# Patient Record
Sex: MALE | Race: WHITE | NOT HISPANIC OR LATINO | Employment: OTHER | URBAN - METROPOLITAN AREA
[De-identification: names, ages, dates, MRNs, and addresses within clinical notes are randomized per-mention and may not be internally consistent; named-entity substitution may affect disease eponyms.]

---

## 2019-03-25 ENCOUNTER — TRANSFERRED RECORDS (OUTPATIENT)
Dept: PHYSICAL THERAPY | Facility: CLINIC | Age: 69
End: 2019-03-25

## 2021-12-20 ENCOUNTER — THERAPY VISIT (OUTPATIENT)
Dept: PHYSICAL THERAPY | Facility: CLINIC | Age: 71
End: 2021-12-20
Payer: COMMERCIAL

## 2021-12-20 DIAGNOSIS — M75.40 IMPINGEMENT SYNDROME, SHOULDER, UNSPECIFIED LATERALITY: ICD-10-CM

## 2021-12-20 DIAGNOSIS — M54.2 NECK PAIN: Primary | ICD-10-CM

## 2021-12-20 DIAGNOSIS — G25.89 SCAPULAR DYSKINESIS: ICD-10-CM

## 2021-12-20 PROCEDURE — 97110 THERAPEUTIC EXERCISES: CPT | Mod: GP | Performed by: PHYSICAL THERAPIST

## 2021-12-20 PROCEDURE — 97161 PT EVAL LOW COMPLEX 20 MIN: CPT | Mod: GP | Performed by: PHYSICAL THERAPIST

## 2021-12-20 NOTE — PROGRESS NOTES
"Physical Therapy Initial Evaluation  Subjective:  The history is provided by the patient. No  was used.   Therapist Generated HPI Evaluation  Problem details: \"Guicho\" is a pleasant 72 yo male referred to physical therapy by SADE Fam , with chief complaint and diagnosis of    1. Cervicalgia  2. R RTC Tear  3. Bilateral Shoudler Impingement syndrome  4. R ACJ Arthrosis     Patient notes that he originally hurt his neck and shoulder while power washing his home last spring with arms overhead and looking upward.     He has been seeing a chiropractor that offers acupuncture, massage and Activator manipulation. He notes of temporary relief.     Guicho has a daily exercise program that he performs that is mostly related to his R hip and low back in a supine position that we had reviewed.     He has discontinued therapy exercises prescribed a few months ago while attending PT that included free weight and therabands.     Guicho had attended PT for roughly 3 months without full resolution, however, he notes that his mobility is improved.     Functionally Guicho's neck and shoulder pain is made worse when laying on his sides, reaching to a high shelf or lifting greater than 10#.     Best at complete rest and minor temporary relief with chiro care.     Overall health is self reported as  fair to good.     He has PMH : of chronic back and neck pain, heart replacement, Thyroid dysfunction, Kidney disease, depression and cancer.     He is retired .         Type of problem:  Right shoulder.    This is a chronic condition.  Condition occurred with:  Lifting and repetition/overuse.  Where condition occurred: at home.  Patient reports pain:  Anterior, lateral, scapular area and upper arm.  Pain is described as aching (7/10 , occasional referred sx's to R hand ) and is intermittent.  Pain radiates to:  Upper arm and cervical. Pain is the same all the time.  Since onset symptoms are unchanged.  Associated " symptoms:  Loss of motion/stiffness, loss of strength and painful arc. Symptoms are exacerbated by carrying, lying on extremity, lifting, using arm at shoulder level, using arm behind back and using arm overhead  and relieved by rest.  Special tests included:  X-ray.  Previous treatment includes physical therapy. There was mild improvement following previous treatment.  Work activity restrictions: retired   Barriers include:  None as reported by patient.                        Objective:  Standing Alignment:    Cervical/Thoracic:  Forward head  Shoulder/UE:  Rounded shoulders and scapular winging R              Gait:    Gait Type:  Antalgic   Weight Bearing Status:  WBAT   Assistive Devices:  None      Flexibility/Screens:   Positive screens:  Cervical          Neurological: He is alert. He has normal motor skills, normal sensation and intact cranial nerves.                      Shoulder Evaluation:  ROM:  AROM:    Flexion:  Left:  140    Right:  150                      Extension/Internal Rotation:  Left:  T12    Right:  T12    PROM:    Flexion:  Left:  150    Right: 160          Internal Rotation:  Left:  90    Right:  90  External Rotation:  Left:  50    Right:  50                    Strength:  : weaka nd painful bilaterall with ER and abduction . Scap dyskiinesia noted R > L . Scapolothoracic  substitution                         Special Tests:    Left shoulder positive for the following special tests:  Impingement  Right shoulder positive for the following special tests:Impingement and Acrimioclavicular  Palpation:    Left shoulder tenderness present at:  Supraspinatus and Upper Trap  Right shoulder tenderness present at: Acrimioclavicular; Supraspinatus and Upper Trap                                     General     ROS    Assessment/Plan:    Patient is a 71 year old male with cervical and both sides shoulder complaints.    Patient has the following significant findings with corresponding treatment plan.                 Diagnosis 1:  Bilateral shoulder pain, scap dyskinesia     Diagnosis 2:  Cervicalgia        Therapy Evaluation Codes:   1) History comprised of:   Personal factors that impact the plan of care:      None.    Comorbidity factors that impact the plan of care are:      Cancer, Depression, Fibromyalgia, Heart problems, High blood pressure, Implanted device, Osteoarthritis and heart replacement, Thyroid dysfunction. .     Medications impacting care: Cardiac, High blood pressure and Tylenol .  2) Examination of Body Systems comprised of:   Body structures and functions that impact the plan of care:      Cervical spine and Shoulder.   Activity limitations that impact the plan of care are:      Bathing, Cooking, Driving, Dressing, Lifting and Sleeping.  3) Clinical presentation characteristics are:   Stable/Uncomplicated.  4) Decision-Making    Moderate complexity using standardized patient assessment instrument and/or measureable assessment of functional outcome.  Cumulative Therapy Evaluation is: Moderate complexity.    Previous and current functional limitations:  (See Goal Flow Sheet for this information)    Short term and Long term goals: (See Goal Flow Sheet for this information)     Communication ability:  Patient appears to be able to clearly communicate and understand verbal and written communication and follow directions correctly.  Treatment Explanation - The following has been discussed with the patient:   RX ordered/plan of care  Anticipated outcomes  Possible risks and side effects  This patient would benefit from PT intervention to resume normal activities.   Rehab potential is good.    Frequency:  1 X week, once daily  Duration:  for 12 weeks  Discharge Plan:  Independent in home treatment program.  Reach maximal therapeutic benefit.    Please refer to the daily flowsheet for treatment today, total treatment time and time spent performing 1:1 timed codes.

## 2021-12-20 NOTE — PROGRESS NOTES
[facility]    OUTPATIENT Physical Therapy ORTHOPEDIC EVALUATION  PLAN OF TREATMENT FOR OUTPATIENT REHABILITATION  (COMPLETE FOR INITIAL CLAIMS ONLY)  Patient's Last Name, First Name, M.I.  YOB: 1950  Guicho Pratt    Provider s Name:  [facility]   Medical Record No.  [id number]   Start of Care Date:  12/20/21   Onset Date:   11/29/21   Type:     _X__PT   ___OT Medical Diagnosis:  No diagnosis found.     Treatment Diagnosis:  R shoudler RTC Tear, cervicalgia         Goals:     12/20/21 0500   Body Part   Goals listed below are for R neck and shoulder    Goal #1   Goal #1 reaching   Previous Functional Level No restrictions   Current Functional Level Cannot reach ;behind back;to shoulder level;overhead;out to the side   STG Target Performance Reach ;to shoulder level;out to the side   Rationale for independent personal hygiene;for dressing;for bathing   Due date 01/24/22   LTG Target Performance Reach;behind back;to shoulder level;overhead;out to the side;Unrestricted reaching   Rationale for independent personal hygiene;for dressing;for bathing;for safe driving, hook seat belt, reach shift lever and turn signal, using both hands on steering wheel   Due date 02/25/22       Therapy Frequency:  1x/week   Predicted Duration of Therapy Intervention:  12 weeks    Hilario Najera, PT                 I CERTIFY THE NEED FOR THESE SERVICES FURNISHED UNDER        THIS PLAN OF TREATMENT AND WHILE UNDER MY CARE .             Physician Signature               Date    X_____________________________________________________                             Certification Date From:  12/20/21   Certification Date To:  03/19/22    Referring Provider:  Oswald Junior    Initial Assessment        See Epic Evaluation SOC Date: 12/20/21

## 2022-01-03 ENCOUNTER — THERAPY VISIT (OUTPATIENT)
Dept: PHYSICAL THERAPY | Facility: CLINIC | Age: 72
End: 2022-01-03
Payer: COMMERCIAL

## 2022-01-03 DIAGNOSIS — M75.40 IMPINGEMENT SYNDROME, SHOULDER, UNSPECIFIED LATERALITY: ICD-10-CM

## 2022-01-03 DIAGNOSIS — M54.2 NECK PAIN: Primary | ICD-10-CM

## 2022-01-03 DIAGNOSIS — G25.89 SCAPULAR DYSKINESIS: ICD-10-CM

## 2022-01-03 PROCEDURE — 97140 MANUAL THERAPY 1/> REGIONS: CPT | Mod: GP | Performed by: PHYSICAL THERAPIST

## 2022-01-03 PROCEDURE — 97110 THERAPEUTIC EXERCISES: CPT | Mod: GP | Performed by: PHYSICAL THERAPIST

## 2022-01-11 ENCOUNTER — THERAPY VISIT (OUTPATIENT)
Dept: PHYSICAL THERAPY | Facility: CLINIC | Age: 72
End: 2022-01-11
Payer: COMMERCIAL

## 2022-01-11 DIAGNOSIS — G25.89 SCAPULAR DYSKINESIS: ICD-10-CM

## 2022-01-11 DIAGNOSIS — M75.40 IMPINGEMENT SYNDROME, SHOULDER, UNSPECIFIED LATERALITY: ICD-10-CM

## 2022-01-11 DIAGNOSIS — M54.2 NECK PAIN: Primary | ICD-10-CM

## 2022-01-11 PROCEDURE — 97530 THERAPEUTIC ACTIVITIES: CPT | Mod: GP | Performed by: PHYSICAL THERAPIST

## 2022-01-11 PROCEDURE — 97140 MANUAL THERAPY 1/> REGIONS: CPT | Mod: GP | Performed by: PHYSICAL THERAPIST

## 2022-01-18 ENCOUNTER — THERAPY VISIT (OUTPATIENT)
Dept: PHYSICAL THERAPY | Facility: CLINIC | Age: 72
End: 2022-01-18
Payer: COMMERCIAL

## 2022-01-18 DIAGNOSIS — M54.2 NECK PAIN: Primary | ICD-10-CM

## 2022-01-18 PROCEDURE — 97012 MECHANICAL TRACTION THERAPY: CPT | Mod: GP | Performed by: PHYSICAL THERAPIST

## 2022-01-18 PROCEDURE — 97035 APP MDLTY 1+ULTRASOUND EA 15: CPT | Mod: GP | Performed by: PHYSICAL THERAPIST

## 2022-01-18 PROCEDURE — 97140 MANUAL THERAPY 1/> REGIONS: CPT | Mod: GP | Performed by: PHYSICAL THERAPIST

## 2022-01-20 ENCOUNTER — THERAPY VISIT (OUTPATIENT)
Dept: PHYSICAL THERAPY | Facility: CLINIC | Age: 72
End: 2022-01-20
Payer: COMMERCIAL

## 2022-01-20 DIAGNOSIS — M54.2 NECK PAIN: Primary | ICD-10-CM

## 2022-01-20 DIAGNOSIS — G25.89 SCAPULAR DYSKINESIS: ICD-10-CM

## 2022-01-20 DIAGNOSIS — M75.40 IMPINGEMENT SYNDROME, SHOULDER, UNSPECIFIED LATERALITY: ICD-10-CM

## 2022-01-20 PROCEDURE — 97035 APP MDLTY 1+ULTRASOUND EA 15: CPT | Mod: GP | Performed by: PHYSICAL THERAPIST

## 2022-01-20 PROCEDURE — 97012 MECHANICAL TRACTION THERAPY: CPT | Mod: GP | Performed by: PHYSICAL THERAPIST

## 2022-01-20 PROCEDURE — 97140 MANUAL THERAPY 1/> REGIONS: CPT | Mod: GP | Performed by: PHYSICAL THERAPIST

## 2022-01-27 ENCOUNTER — THERAPY VISIT (OUTPATIENT)
Dept: PHYSICAL THERAPY | Facility: CLINIC | Age: 72
End: 2022-01-27
Payer: COMMERCIAL

## 2022-01-27 DIAGNOSIS — M54.2 NECK PAIN: Primary | ICD-10-CM

## 2022-01-27 DIAGNOSIS — G25.89 SCAPULAR DYSKINESIS: ICD-10-CM

## 2022-01-27 PROCEDURE — 97110 THERAPEUTIC EXERCISES: CPT | Mod: GP | Performed by: PHYSICAL THERAPIST

## 2022-01-27 PROCEDURE — 97012 MECHANICAL TRACTION THERAPY: CPT | Mod: GP | Performed by: PHYSICAL THERAPIST

## 2022-01-27 PROCEDURE — 97035 APP MDLTY 1+ULTRASOUND EA 15: CPT | Mod: GP | Performed by: PHYSICAL THERAPIST

## 2022-02-17 ENCOUNTER — THERAPY VISIT (OUTPATIENT)
Dept: PHYSICAL THERAPY | Facility: CLINIC | Age: 72
End: 2022-02-17
Payer: COMMERCIAL

## 2022-02-17 DIAGNOSIS — G25.89 SCAPULAR DYSKINESIS: ICD-10-CM

## 2022-02-17 DIAGNOSIS — M54.2 NECK PAIN: Primary | ICD-10-CM

## 2022-02-17 PROCEDURE — 97035 APP MDLTY 1+ULTRASOUND EA 15: CPT | Mod: GP | Performed by: PHYSICAL THERAPIST

## 2022-02-17 PROCEDURE — 97140 MANUAL THERAPY 1/> REGIONS: CPT | Mod: GP | Performed by: PHYSICAL THERAPIST

## 2022-02-17 PROCEDURE — 97012 MECHANICAL TRACTION THERAPY: CPT | Mod: GP | Performed by: PHYSICAL THERAPIST

## 2022-02-17 NOTE — PROGRESS NOTES
Subjective:  HPI  Physical Exam                    Objective:  System    Physical Exam    General     ROS    Assessment/Plan:    PROGRESS  REPORT    Progress reporting period is from 12/20/2021 to 2/17/2022. 6 visits .     SUBJECTIVE   Guicho notes that he had a shoulder injection last week.   No significant change.   Scheduled for neck on Tuesday next week.    SPADI improved from 50/100 to 30/100      Current Pain level: 2/10   Initial Pain level: 8/10   Changes in function: No changes noted in function since last SOAP note   Adverse reactions: None;   ,         OBJECTIVE  Primary sharp pain is hit and miss. Chiro seems to help with neck pain.     He is hit and miss with shoudler exercises. Primary complaint is the neck and referres sx's.     Plan of care at this point is to really manage neck pain and once resolved, it will be clear to manage shoulder strength and function.       ASSESSMENT/PLAN  Updated problem list and treatment plan: Diagnosis 1:  Neck pain, spondylosis, HNP     Diagnosis 2:  R shoulder pain/dyskinesia      STG/LTGs have been met or progress has been made towards goals:  Yes (See Goal flow sheet completed today.)  Assessment of Progress: The patient's condition is improving.  The patient's condition has potential to improve.      Recommendations:  This patient would benefit from further evaluation.    Please refer to the daily flowsheet for treatment today, total treatment time and time spent performing 1:1 timed codes.

## 2022-03-21 ENCOUNTER — THERAPY VISIT (OUTPATIENT)
Dept: PHYSICAL THERAPY | Facility: CLINIC | Age: 72
End: 2022-03-21
Payer: COMMERCIAL

## 2022-03-21 DIAGNOSIS — G25.89 SCAPULAR DYSKINESIS: ICD-10-CM

## 2022-03-21 DIAGNOSIS — M75.40 IMPINGEMENT SYNDROME, SHOULDER, UNSPECIFIED LATERALITY: ICD-10-CM

## 2022-03-21 DIAGNOSIS — M54.2 NECK PAIN: Primary | ICD-10-CM

## 2022-03-21 PROCEDURE — 97112 NEUROMUSCULAR REEDUCATION: CPT | Mod: GP | Performed by: PHYSICAL THERAPIST

## 2022-03-21 PROCEDURE — 97035 APP MDLTY 1+ULTRASOUND EA 15: CPT | Mod: GP | Performed by: PHYSICAL THERAPIST

## 2022-04-22 NOTE — PROGRESS NOTES
Subjective:  HPI  Physical Exam                    Objective:  System    Physical Exam    General     ROS    Assessment/Plan:    PROGRESS  REPORT      SUBJECTIVE   Patient returns to PT with chief complaint of  R neck, shoulder and intermittant hand sx's.     He has not been seen in therapy for the past 5 weeks .     Follow up with chiro every 2 weeks or so.     Patient feels that chiro has assisted with pain, ROM and function.     Sleeping better at night secondary to lessening neck pain.      Current Pain level: 3/10   Initial Pain level: 8/10   Changes in function: Yes, see goal flow sheet for change in function   Adverse reactions: None;   ,         OBJECTIVE   Cx ROM R rotation and SB PDM , reproduces pain to scapula as well as better mobility to the L but still reproduces sx's to the R scapula.     Supraspinatus/ Infraspinatus mm atrophy noted on exam.     Scap dyskinesia.       ASSESSMENT/PLAN  Updated problem list and treatment plan: Diagnosis 1:  Shoulder pain     Diagnosis 2:  Neck pain      STG/LTGs have been met or progress has been made towards goals:  Yes (See Goal flow sheet completed today.)  Assessment of Progress: The patient's progress has slowed.  Self Management Plans:  Patient has been instructed in a home treatment program.      Recommendations:  This patient would benefit from further evaluation.    Please refer to the daily flowsheet for treatment today, total treatment time and time spent performing 1:1 timed codes.

## 2022-06-16 ENCOUNTER — TRANSCRIBE ORDERS (OUTPATIENT)
Dept: OTHER | Age: 72
End: 2022-06-16

## 2022-06-16 DIAGNOSIS — G89.29 CHRONIC RIGHT SHOULDER PAIN: Primary | ICD-10-CM

## 2022-06-16 DIAGNOSIS — M25.511 CHRONIC RIGHT SHOULDER PAIN: Primary | ICD-10-CM

## 2022-06-16 DIAGNOSIS — M75.121 NONTRAUMATIC COMPLETE TEAR OF RIGHT ROTATOR CUFF: ICD-10-CM

## 2022-06-30 ENCOUNTER — THERAPY VISIT (OUTPATIENT)
Dept: PHYSICAL THERAPY | Facility: CLINIC | Age: 72
End: 2022-06-30
Attending: ORTHOPAEDIC SURGERY
Payer: COMMERCIAL

## 2022-06-30 DIAGNOSIS — G89.29 CHRONIC RIGHT SHOULDER PAIN: ICD-10-CM

## 2022-06-30 DIAGNOSIS — M25.511 CHRONIC RIGHT SHOULDER PAIN: ICD-10-CM

## 2022-06-30 DIAGNOSIS — M75.121 NONTRAUMATIC COMPLETE TEAR OF RIGHT ROTATOR CUFF: ICD-10-CM

## 2022-06-30 PROCEDURE — 97112 NEUROMUSCULAR REEDUCATION: CPT | Mod: GP | Performed by: PHYSICAL THERAPIST

## 2022-06-30 PROCEDURE — 97110 THERAPEUTIC EXERCISES: CPT | Mod: GP | Performed by: PHYSICAL THERAPIST

## 2022-06-30 NOTE — PROGRESS NOTES
[facility]    OUTPATIENT {PT.OT:038311} ORTHOPEDIC EVALUATION  PLAN OF TREATMENT FOR OUTPATIENT REHABILITATION  (COMPLETE FOR INITIAL CLAIMS ONLY)  Patient's Last Name, First Name, M.I.  YOB: 1950  Guicho Pratt    Provider s Name:  [facility]   Medical Record No.  [id number]   Start of Care Date:  12/20/21   Onset Date:   11/29/21   Type:     _X__PT   ___OT Medical Diagnosis:    Encounter Diagnoses   Name Primary?    Chronic right shoulder pain     Nontraumatic complete tear of right rotator cuff         Treatment Diagnosis:  R complete Tear RTC , cevicalgia        Goals:  ***    Therapy Frequency:  1x/week  Predicted Duration of Therapy Intervention:  12    Hilario Najera, PT               {Rehab Co-Sign/Paper:155638}           Certification Date From:  06/17/22   Certification Date To:  09/17/22    Referring Provider:  Carmine Mojica    Initial Assessment        See Epic Evaluation SOC Date: 12/20/21

## 2022-06-30 NOTE — PROGRESS NOTES
Subjective:  HPI  Physical Exam                    Objective:  System    Physical Exam    General     ROS    Assessment/Plan:    PROGRESS  REPORT    Progress reporting period is from 3/21/2022 to 6/30/2022.      SUBJECTIVE   Guicho has been self managing R shoulder and neck pain on his own for the past 3 months.     He was last seen in PT, 3/21/2022.     He elected to return to Dr. Mojica for consult while seeing his chiropractor for mixed intervention, including accupuncture, laser, and actuator manipulation.     Patient notes of temporary reduction of pain sx's with Laser.     He is frustrated with care model of not being able to move beyond the pain and dysfunction.     He has also been using oral gabapentin and Tylenol to manage painful nights. He sleeps better when medicated.     Functionally he is most limited with activity such as mowing his lawn, caring for his garden and light activity that he feels he should be able to do without hurting.       Current Pain level: 6/10   Initial Pain level: 8/10   Changes in function: No changes noted in function since last SOAP note   Adverse reactions: None;   ,       OBJECTIVE  Guicho does have infra and supraspinatus MM atrophy on exam.     He is not able to raise his arm more than 60-80 degrees without pain and significant scapular hike.     He has regressed on function since March noted by SPADI at 31/100 to 75/100 now.     Guicho will work on high rep endurance strength in a mid range, panfree arc for deltoid and subscapularis strengthening as directed.     Follow up in 2-3 weeks, assess function and pain management, modify self care program at that point.      ASSESSMENT/PLAN  Updated problem list and treatment plan: Diagnosis 1:  R shoulder pain       STG/LTGs have been met or progress has been made towards goals:  None  Assessment of Progress: The patient has had set backs in their progress.  Self Management Plans:  Patient has been instructed in a home treatment  program.        Recommendations:  This patient would benefit from continued therapy.     Frequency:  1 X week, once daily  Duration:  for 12 weeks        Please refer to the daily flowsheet for treatment today, total treatment time and time spent performing 1:1 timed codes.

## 2022-06-30 NOTE — PROGRESS NOTES
[facility]    OUTPATIENT Physical Therapy ORTHOPEDIC EVALUATION  PLAN OF TREATMENT FOR OUTPATIENT REHABILITATION  (COMPLETE FOR INITIAL CLAIMS ONLY)  Patient's Last Name, First Name, M.I.  YOB: 1950  Guicho Pratt    Provider s Name:  [facility]   Medical Record No.  [id number]   Start of Care Date:  12/20/21   Onset Date:   11/29/21   Type:     _X__PT   ___OT Medical Diagnosis:    Encounter Diagnoses   Name Primary?    Chronic right shoulder pain     Nontraumatic complete tear of right rotator cuff         Treatment Diagnosis:  R complete Tear RTC , cevicalgia        Goals:     06/30/22 0500   Body Part   Goals listed below are for R neck and shoulder    Goal #1   Goal #1 reaching   Previous Functional Level No restrictions   Current Functional Level Can reach ;to shoulder level   Performance level 70-80 degrees   STG Target Performance Reach ;to shoulder level;out to the side   Performance level 100-120   Rationale for independent personal hygiene;for dressing;for bathing   Due date 07/15/22   LTG Target Performance Reach;behind back;to shoulder level;overhead;out to the side;Unrestricted reaching   Rationale for independent personal hygiene;for dressing;for bathing;for safe driving, hook seat belt, reach shift lever and turn signal, using both hands on steering wheel   Due date 09/27/22       Therapy Frequency:  1x/week  Predicted Duration of Therapy Intervention:  12    Hilario Najera, PT                 I CERTIFY THE NEED FOR THESE SERVICES FURNISHED UNDER        THIS PLAN OF TREATMENT AND WHILE UNDER MY CARE .             Physician Signature               Date    X_____________________________________________________                         Certification Date From:  06/17/22   Certification Date To:  09/17/22    Referring Provider:  Carmine Riojas  Assessment        See Epic Evaluation SOC Date: 12/20/21

## 2022-07-26 ENCOUNTER — THERAPY VISIT (OUTPATIENT)
Dept: PHYSICAL THERAPY | Facility: CLINIC | Age: 72
End: 2022-07-26
Payer: COMMERCIAL

## 2022-07-26 DIAGNOSIS — M75.121 NONTRAUMATIC COMPLETE TEAR OF RIGHT ROTATOR CUFF: ICD-10-CM

## 2022-07-26 DIAGNOSIS — G89.29 CHRONIC RIGHT SHOULDER PAIN: Primary | ICD-10-CM

## 2022-07-26 DIAGNOSIS — M25.511 CHRONIC RIGHT SHOULDER PAIN: Primary | ICD-10-CM

## 2022-07-26 PROCEDURE — 97112 NEUROMUSCULAR REEDUCATION: CPT | Mod: GP | Performed by: PHYSICAL THERAPIST

## 2022-07-26 PROCEDURE — 97110 THERAPEUTIC EXERCISES: CPT | Mod: GP | Performed by: PHYSICAL THERAPIST

## 2022-08-24 ENCOUNTER — THERAPY VISIT (OUTPATIENT)
Dept: PHYSICAL THERAPY | Facility: CLINIC | Age: 72
End: 2022-08-24
Payer: COMMERCIAL

## 2022-08-24 DIAGNOSIS — M75.121 NONTRAUMATIC COMPLETE TEAR OF RIGHT ROTATOR CUFF: ICD-10-CM

## 2022-08-24 DIAGNOSIS — G89.29 CHRONIC RIGHT SHOULDER PAIN: Primary | ICD-10-CM

## 2022-08-24 DIAGNOSIS — M25.511 CHRONIC RIGHT SHOULDER PAIN: Primary | ICD-10-CM

## 2022-08-24 PROCEDURE — 97110 THERAPEUTIC EXERCISES: CPT | Mod: GP | Performed by: PHYSICAL THERAPIST

## 2022-08-30 ENCOUNTER — TRANSCRIBE ORDERS (OUTPATIENT)
Dept: OTHER | Age: 72
End: 2022-08-30

## 2022-08-30 DIAGNOSIS — M41.9 SCOLIOSIS DEFORMITY OF SPINE: Primary | ICD-10-CM

## 2022-09-13 ENCOUNTER — THERAPY VISIT (OUTPATIENT)
Dept: PHYSICAL THERAPY | Facility: CLINIC | Age: 72
End: 2022-09-13
Payer: COMMERCIAL

## 2022-09-13 DIAGNOSIS — G89.29 CHRONIC BILATERAL LOW BACK PAIN WITH RIGHT-SIDED SCIATICA: ICD-10-CM

## 2022-09-13 DIAGNOSIS — M54.41 CHRONIC BILATERAL LOW BACK PAIN WITH RIGHT-SIDED SCIATICA: ICD-10-CM

## 2022-09-13 DIAGNOSIS — G89.29 CHRONIC LEFT-SIDED LOW BACK PAIN WITH LEFT-SIDED SCIATICA: Primary | ICD-10-CM

## 2022-09-13 DIAGNOSIS — M54.42 CHRONIC LEFT-SIDED LOW BACK PAIN WITH LEFT-SIDED SCIATICA: Primary | ICD-10-CM

## 2022-09-13 PROCEDURE — 97161 PT EVAL LOW COMPLEX 20 MIN: CPT | Mod: GP | Performed by: PHYSICAL THERAPIST

## 2022-09-13 PROCEDURE — 97110 THERAPEUTIC EXERCISES: CPT | Mod: GP | Performed by: PHYSICAL THERAPIST

## 2022-09-15 NOTE — PROGRESS NOTES
[facility]    OUTPATIENT Physical Therapy ORTHOPEDIC EVALUATION  PLAN OF TREATMENT FOR OUTPATIENT REHABILITATION  (COMPLETE FOR INITIAL CLAIMS ONLY)  Patient's Last Name, First Name, M.I.  YOB: 1950  Guicho Pratt    Provider s Name:  [facility]   Medical Record No.  [id number]   Start of Care Date:  09/13/22   Onset Date:   08/30/21   Type:     _X__PT   ___OT Medical Diagnosis:    Encounter Diagnoses   Name Primary?    Chronic left-sided low back pain with left-sided sciatica Yes    Chronic bilateral low back pain with right-sided sciatica         Treatment Diagnosis:           Goals:     09/13/22 0500   Body Part   Goals listed below are for spine scoliosis   Goal #2   Goal #2 standing   Previous Functional Level No restrictions   Current Functional Level Minutes patient can stand   Performance level 3-5 minutes   STG Target Performance Minutes patient will be able to stand   Performance level 10   Rationale for housekeeping tasks such as vacuuming, bed making, mowing, gardening;for personal hygiene;for meal preparation   Due date 10/28/22   LTG Target Performance Minutes patient will be able to stand   Performance Level 15-20   Rationale for safe community ambulation;for safe household ambulation;for meal preparation;for personal hygiene   Due date 12/11/22       Therapy Frequency:  1x/week  Predicted Duration of Therapy Intervention:  12    Hilario Najera, PT                 I CERTIFY THE NEED FOR THESE SERVICES FURNISHED UNDER        THIS PLAN OF TREATMENT AND WHILE UNDER MY CARE .             Physician Signature               Date    X_____________________________________________________                         Certification Date From:  09/13/22   Certification Date To:  12/11/22    Referring Provider:  Opal Alexander    Initial Assessment        See Epic  Evaluation SOC Date: 09/13/22

## 2022-09-15 NOTE — PROGRESS NOTES
"Physical Therapy Initial Evaluation  Subjective:  The history is provided by the patient. No  was used.   Therapist Generated HPI Evaluation  Problem details: \" Guicho\" is a pleasant 71 yo male referred to physical therapy under the direction of Dr. Alexander.     DX: LUMBAR SCOLIOSIS WITH BILATERAL LEG PAIN  Treatment Order: eval and treat    Special Tests: MRI, Mylogram, CT Scan     Intervention: chiro care: traction   Worsened: lifting, turning, and twisting     Patient goal: 100% painfree and full activity without limitation.         Type of problem:  Lumbar.    This is a chronic condition.  Condition occurred with:  Degenerative joint disease, insidious onset, repetition/overuse, lifting and twisting.  Where condition occurred: for unknown reasons.  Patient reports pain:  Lower lumbar spine.  Pain is described as sharp and is intermittent (8/10).  Pain radiates to:  Foot right, foot left, lower leg left, lower leg right, knee right, knee left and thigh right. Pain is the same all the time.  Since onset symptoms are gradually worsening.  Associated symptoms:  Loss of motion, loss of motion/stiffness, loss of strength and numbness. Symptoms are exacerbated by bending and other  and relieved by ice and NSAID's (traction ).  Special tests included:  X-ray and MRI.  Previous treatment includes chiropractic. There was mild improvement following previous treatment.  Work activity restrictions: retired   Barriers include:  None as reported by patient.      Oswestry Score: 44 %                 Objective:  Standing Alignment:    Cervical/Thoracic:  Forward head  Shoulder/UE:  Rounded shoulders  Lumbar:  Lordosis decr                Flexibility/Screens:   Positive screens:  Lumbar          Neurological: He has normal motor skills and normal sensation.            Lumbar/SI Evaluation  ROM:    AROM Lumbar:   Flexion:          Forward flexion finger tips to mid shin, end range central low back pain  Ext:      "               Significantly limited just beyond neutral , End range central low back pain    Side Bend:        Left:  End range pain , L low back pain     Right:  End range pain, R low back pain   Rotation:           Left:     Right:   Side Glide:        Left:     Right:           Lumbar Myotomes:  normal            Lumbar DTR's:  normal        Lumbar Dermtomes:  normal                Neural Tension/Mobility:  Lumbar:  Not assessed        Lumbar Palpation:  not assessed                                                         General     ROS    Assessment/Plan:    Patient is a 72 year old male with lumbar complaints.    Patient has the following significant findings with corresponding treatment plan.                Diagnosis 1:  Low back scoliosis       Therapy Evaluation Codes:   1) History comprised of:   Personal factors that impact the plan of care:      None.    Comorbidity factors that impact the plan of care are:      None.     Medications impacting care: None.  2) Examination of Body Systems comprised of:   Body structures and functions that impact the plan of care:      Lumbar spine.   Activity limitations that impact the plan of care are:      Bending, Driving, Dressing, Lifting, Sitting, Stairs, Standing and Walking.  3) Clinical presentation characteristics are:   Stable/Uncomplicated.  4) Decision-Making    Low complexity using standardized patient assessment instrument and/or measureable assessment of functional outcome.  Cumulative Therapy Evaluation is: Low complexity.    Previous and current functional limitations:  (See Goal Flow Sheet for this information)    Short term and Long term goals: (See Goal Flow Sheet for this information)     Communication ability:  Patient appears to be able to clearly communicate and understand verbal and written communication and follow directions correctly.  Treatment Explanation - The following has been discussed with the patient:   RX ordered/plan of  care  Anticipated outcomes  Possible risks and side effects  This patient would benefit from PT intervention to resume normal activities.   Rehab potential is fair.    Frequency:  1 X week, once daily  Duration:  for 12 weeks  Discharge Plan:  Achieve all LTG.  Independent in home treatment program.  Reach maximal therapeutic benefit.    Rehab Plans: lumbar stabilization, global conditioning. Careful of neck and R shoulder     Please refer to the daily flowsheet for treatment today, total treatment time and time spent performing 1:1 timed codes.

## 2022-09-20 ENCOUNTER — THERAPY VISIT (OUTPATIENT)
Dept: PHYSICAL THERAPY | Facility: CLINIC | Age: 72
End: 2022-09-20
Payer: COMMERCIAL

## 2022-09-20 DIAGNOSIS — M54.41 CHRONIC BILATERAL LOW BACK PAIN WITH RIGHT-SIDED SCIATICA: ICD-10-CM

## 2022-09-20 DIAGNOSIS — G89.29 CHRONIC RIGHT SHOULDER PAIN: ICD-10-CM

## 2022-09-20 DIAGNOSIS — G89.29 CHRONIC BILATERAL LOW BACK PAIN WITH RIGHT-SIDED SCIATICA: ICD-10-CM

## 2022-09-20 DIAGNOSIS — M54.42 CHRONIC LEFT-SIDED LOW BACK PAIN WITH LEFT-SIDED SCIATICA: Primary | ICD-10-CM

## 2022-09-20 DIAGNOSIS — G89.29 CHRONIC LEFT-SIDED LOW BACK PAIN WITH LEFT-SIDED SCIATICA: Primary | ICD-10-CM

## 2022-09-20 DIAGNOSIS — M25.511 CHRONIC RIGHT SHOULDER PAIN: ICD-10-CM

## 2022-09-20 PROCEDURE — 97110 THERAPEUTIC EXERCISES: CPT | Mod: GP | Performed by: PHYSICAL THERAPIST

## 2022-10-06 ENCOUNTER — THERAPY VISIT (OUTPATIENT)
Dept: PHYSICAL THERAPY | Facility: CLINIC | Age: 72
End: 2022-10-06
Payer: COMMERCIAL

## 2022-10-06 DIAGNOSIS — M54.42 CHRONIC LEFT-SIDED LOW BACK PAIN WITH LEFT-SIDED SCIATICA: Primary | ICD-10-CM

## 2022-10-06 DIAGNOSIS — M54.41 CHRONIC BILATERAL LOW BACK PAIN WITH RIGHT-SIDED SCIATICA: ICD-10-CM

## 2022-10-06 DIAGNOSIS — G89.29 CHRONIC BILATERAL LOW BACK PAIN WITH RIGHT-SIDED SCIATICA: ICD-10-CM

## 2022-10-06 DIAGNOSIS — G89.29 CHRONIC LEFT-SIDED LOW BACK PAIN WITH LEFT-SIDED SCIATICA: Primary | ICD-10-CM

## 2022-10-06 PROCEDURE — 97110 THERAPEUTIC EXERCISES: CPT | Mod: GP | Performed by: PHYSICAL THERAPIST

## 2022-10-18 ENCOUNTER — THERAPY VISIT (OUTPATIENT)
Dept: PHYSICAL THERAPY | Facility: CLINIC | Age: 72
End: 2022-10-18
Payer: COMMERCIAL

## 2022-10-18 DIAGNOSIS — M54.41 CHRONIC BILATERAL LOW BACK PAIN WITH RIGHT-SIDED SCIATICA: ICD-10-CM

## 2022-10-18 DIAGNOSIS — M54.42 CHRONIC LEFT-SIDED LOW BACK PAIN WITH LEFT-SIDED SCIATICA: Primary | ICD-10-CM

## 2022-10-18 DIAGNOSIS — G89.29 CHRONIC BILATERAL LOW BACK PAIN WITH RIGHT-SIDED SCIATICA: ICD-10-CM

## 2022-10-18 DIAGNOSIS — G89.29 CHRONIC LEFT-SIDED LOW BACK PAIN WITH LEFT-SIDED SCIATICA: Primary | ICD-10-CM

## 2022-10-18 PROCEDURE — 97110 THERAPEUTIC EXERCISES: CPT | Mod: GP | Performed by: PHYSICAL THERAPIST

## 2022-10-18 PROCEDURE — 97012 MECHANICAL TRACTION THERAPY: CPT | Mod: GP | Performed by: PHYSICAL THERAPIST

## 2022-10-28 ENCOUNTER — THERAPY VISIT (OUTPATIENT)
Dept: PHYSICAL THERAPY | Facility: CLINIC | Age: 72
End: 2022-10-28
Payer: COMMERCIAL

## 2022-10-28 DIAGNOSIS — G89.29 CHRONIC BILATERAL LOW BACK PAIN WITH RIGHT-SIDED SCIATICA: ICD-10-CM

## 2022-10-28 DIAGNOSIS — M54.42 CHRONIC LEFT-SIDED LOW BACK PAIN WITH LEFT-SIDED SCIATICA: Primary | ICD-10-CM

## 2022-10-28 DIAGNOSIS — G89.29 CHRONIC LEFT-SIDED LOW BACK PAIN WITH LEFT-SIDED SCIATICA: Primary | ICD-10-CM

## 2022-10-28 DIAGNOSIS — M54.41 CHRONIC BILATERAL LOW BACK PAIN WITH RIGHT-SIDED SCIATICA: ICD-10-CM

## 2022-10-28 PROCEDURE — 97110 THERAPEUTIC EXERCISES: CPT | Mod: GP | Performed by: PHYSICAL THERAPIST

## 2022-10-28 PROCEDURE — 97012 MECHANICAL TRACTION THERAPY: CPT | Mod: GP | Performed by: PHYSICAL THERAPIST

## 2022-10-31 ENCOUNTER — TRANSCRIBE ORDERS (OUTPATIENT)
Dept: OTHER | Age: 72
End: 2022-10-31

## 2022-10-31 DIAGNOSIS — M51.369 OTHER INTERVERTEBRAL DISC DEGENERATION, LUMBAR REGION: ICD-10-CM

## 2022-10-31 DIAGNOSIS — M43.16 SPONDYLOLISTHESIS OF LUMBAR REGION: ICD-10-CM

## 2022-10-31 DIAGNOSIS — M41.9 SCOLIOSIS DEFORMITY OF SPINE: Primary | ICD-10-CM

## 2022-10-31 DIAGNOSIS — M25.9 JOINT DISORDER, UNSPECIFIED: ICD-10-CM

## 2022-10-31 DIAGNOSIS — M50.20 OTHER CERVICAL DISC DISPLACEMENT, UNSPECIFIED CERVICAL REGION: ICD-10-CM

## 2022-10-31 DIAGNOSIS — M41.26 OTHER IDIOPATHIC SCOLIOSIS, LUMBAR REGION: ICD-10-CM

## 2022-10-31 DIAGNOSIS — M54.12 RADICULOPATHY, CERVICAL REGION: ICD-10-CM

## 2022-11-09 ENCOUNTER — TRANSCRIBE ORDERS (OUTPATIENT)
Dept: OTHER | Age: 72
End: 2022-11-09

## 2022-11-09 DIAGNOSIS — M79.18 MYOFASCIAL PAIN: ICD-10-CM

## 2022-11-09 DIAGNOSIS — M53.3 DISORDER OF SACRUM: ICD-10-CM

## 2022-11-09 DIAGNOSIS — M54.50 LOW BACK PAIN: ICD-10-CM

## 2022-11-09 DIAGNOSIS — M79.10 MYALGIA, UNSPECIFIED SITE: Primary | ICD-10-CM

## 2022-11-16 ENCOUNTER — THERAPY VISIT (OUTPATIENT)
Dept: PHYSICAL THERAPY | Facility: CLINIC | Age: 72
End: 2022-11-16
Payer: COMMERCIAL

## 2022-11-16 DIAGNOSIS — G89.29 CHRONIC RIGHT SHOULDER PAIN: ICD-10-CM

## 2022-11-16 DIAGNOSIS — G89.29 CHRONIC LEFT-SIDED LOW BACK PAIN WITH LEFT-SIDED SCIATICA: Primary | ICD-10-CM

## 2022-11-16 DIAGNOSIS — M54.42 CHRONIC LEFT-SIDED LOW BACK PAIN WITH LEFT-SIDED SCIATICA: Primary | ICD-10-CM

## 2022-11-16 DIAGNOSIS — M75.121 NONTRAUMATIC COMPLETE TEAR OF RIGHT ROTATOR CUFF: ICD-10-CM

## 2022-11-16 DIAGNOSIS — M54.41 CHRONIC BILATERAL LOW BACK PAIN WITH RIGHT-SIDED SCIATICA: ICD-10-CM

## 2022-11-16 DIAGNOSIS — M54.2 NECK PAIN: ICD-10-CM

## 2022-11-16 DIAGNOSIS — G89.29 CHRONIC BILATERAL LOW BACK PAIN WITH RIGHT-SIDED SCIATICA: ICD-10-CM

## 2022-11-16 DIAGNOSIS — M25.511 CHRONIC RIGHT SHOULDER PAIN: ICD-10-CM

## 2022-11-16 PROCEDURE — 97012 MECHANICAL TRACTION THERAPY: CPT | Mod: GP | Performed by: PHYSICAL THERAPIST

## 2022-11-16 PROCEDURE — 97110 THERAPEUTIC EXERCISES: CPT | Mod: GP | Performed by: PHYSICAL THERAPIST

## 2022-11-23 ENCOUNTER — THERAPY VISIT (OUTPATIENT)
Dept: PHYSICAL THERAPY | Facility: CLINIC | Age: 72
End: 2022-11-23
Payer: COMMERCIAL

## 2022-11-23 DIAGNOSIS — G89.29 CHRONIC LEFT-SIDED LOW BACK PAIN WITH LEFT-SIDED SCIATICA: Primary | ICD-10-CM

## 2022-11-23 DIAGNOSIS — M54.41 CHRONIC BILATERAL LOW BACK PAIN WITH RIGHT-SIDED SCIATICA: ICD-10-CM

## 2022-11-23 DIAGNOSIS — G89.29 CHRONIC BILATERAL LOW BACK PAIN WITH RIGHT-SIDED SCIATICA: ICD-10-CM

## 2022-11-23 DIAGNOSIS — M54.42 CHRONIC LEFT-SIDED LOW BACK PAIN WITH LEFT-SIDED SCIATICA: Primary | ICD-10-CM

## 2022-11-23 PROCEDURE — 97140 MANUAL THERAPY 1/> REGIONS: CPT | Mod: GP

## 2022-11-23 PROCEDURE — 97110 THERAPEUTIC EXERCISES: CPT | Mod: GP

## 2022-11-25 ENCOUNTER — THERAPY VISIT (OUTPATIENT)
Dept: PHYSICAL THERAPY | Facility: CLINIC | Age: 72
End: 2022-11-25
Payer: COMMERCIAL

## 2022-11-25 DIAGNOSIS — M54.41 CHRONIC BILATERAL LOW BACK PAIN WITH RIGHT-SIDED SCIATICA: ICD-10-CM

## 2022-11-25 DIAGNOSIS — M54.42 CHRONIC LEFT-SIDED LOW BACK PAIN WITH LEFT-SIDED SCIATICA: Primary | ICD-10-CM

## 2022-11-25 DIAGNOSIS — G89.29 CHRONIC BILATERAL LOW BACK PAIN WITH RIGHT-SIDED SCIATICA: ICD-10-CM

## 2022-11-25 DIAGNOSIS — G89.29 CHRONIC LEFT-SIDED LOW BACK PAIN WITH LEFT-SIDED SCIATICA: Primary | ICD-10-CM

## 2022-11-25 PROCEDURE — 97140 MANUAL THERAPY 1/> REGIONS: CPT | Mod: GP

## 2022-11-25 PROCEDURE — 97110 THERAPEUTIC EXERCISES: CPT | Mod: GP

## 2022-11-28 ENCOUNTER — THERAPY VISIT (OUTPATIENT)
Dept: PHYSICAL THERAPY | Facility: CLINIC | Age: 72
End: 2022-11-28
Payer: COMMERCIAL

## 2022-11-28 DIAGNOSIS — M54.42 CHRONIC LEFT-SIDED LOW BACK PAIN WITH LEFT-SIDED SCIATICA: Primary | ICD-10-CM

## 2022-11-28 DIAGNOSIS — M54.41 CHRONIC BILATERAL LOW BACK PAIN WITH RIGHT-SIDED SCIATICA: ICD-10-CM

## 2022-11-28 DIAGNOSIS — G89.29 CHRONIC LEFT-SIDED LOW BACK PAIN WITH LEFT-SIDED SCIATICA: Primary | ICD-10-CM

## 2022-11-28 DIAGNOSIS — G89.29 CHRONIC BILATERAL LOW BACK PAIN WITH RIGHT-SIDED SCIATICA: ICD-10-CM

## 2022-11-28 PROCEDURE — 97110 THERAPEUTIC EXERCISES: CPT | Mod: GP

## 2022-12-07 ENCOUNTER — THERAPY VISIT (OUTPATIENT)
Dept: PHYSICAL THERAPY | Facility: CLINIC | Age: 72
End: 2022-12-07
Payer: COMMERCIAL

## 2022-12-07 DIAGNOSIS — M54.42 CHRONIC LEFT-SIDED LOW BACK PAIN WITH LEFT-SIDED SCIATICA: Primary | ICD-10-CM

## 2022-12-07 DIAGNOSIS — G89.29 CHRONIC LEFT-SIDED LOW BACK PAIN WITH LEFT-SIDED SCIATICA: Primary | ICD-10-CM

## 2022-12-07 PROCEDURE — 97110 THERAPEUTIC EXERCISES: CPT | Mod: GP

## 2022-12-13 ENCOUNTER — THERAPY VISIT (OUTPATIENT)
Dept: PHYSICAL THERAPY | Facility: CLINIC | Age: 72
End: 2022-12-13
Payer: COMMERCIAL

## 2022-12-13 DIAGNOSIS — M54.42 CHRONIC LEFT-SIDED LOW BACK PAIN WITH LEFT-SIDED SCIATICA: Primary | ICD-10-CM

## 2022-12-13 DIAGNOSIS — G89.29 CHRONIC RIGHT SHOULDER PAIN: ICD-10-CM

## 2022-12-13 DIAGNOSIS — M25.511 CHRONIC RIGHT SHOULDER PAIN: ICD-10-CM

## 2022-12-13 DIAGNOSIS — G89.29 CHRONIC LEFT-SIDED LOW BACK PAIN WITH LEFT-SIDED SCIATICA: Primary | ICD-10-CM

## 2022-12-13 PROCEDURE — 97110 THERAPEUTIC EXERCISES: CPT | Mod: GP | Performed by: PHYSICAL THERAPIST

## 2022-12-15 ENCOUNTER — THERAPY VISIT (OUTPATIENT)
Dept: PHYSICAL THERAPY | Facility: CLINIC | Age: 72
End: 2022-12-15
Payer: COMMERCIAL

## 2022-12-15 DIAGNOSIS — G89.29 CHRONIC LEFT-SIDED LOW BACK PAIN WITH LEFT-SIDED SCIATICA: Primary | ICD-10-CM

## 2022-12-15 DIAGNOSIS — M54.42 CHRONIC LEFT-SIDED LOW BACK PAIN WITH LEFT-SIDED SCIATICA: Primary | ICD-10-CM

## 2022-12-15 DIAGNOSIS — G89.29 CHRONIC RIGHT SHOULDER PAIN: ICD-10-CM

## 2022-12-15 DIAGNOSIS — M25.511 CHRONIC RIGHT SHOULDER PAIN: ICD-10-CM

## 2022-12-15 PROCEDURE — 97012 MECHANICAL TRACTION THERAPY: CPT | Mod: GP | Performed by: PHYSICAL THERAPIST

## 2022-12-15 PROCEDURE — 97110 THERAPEUTIC EXERCISES: CPT | Mod: GP | Performed by: PHYSICAL THERAPIST

## 2022-12-15 NOTE — PROGRESS NOTES
Subjective:  HPI  Physical Exam                    Objective:  System    Physical Exam    General     ROS    Assessment/Plan:    PROGRESS  REPORT      SUBJECTIVE      Guicho notes that his R shoulder is beginning to irritate and fail performance again. Low back is no change despite injection, therapy, self stretching.. He is feels that globally strength and function is becoming more difficult.     Initial Pain level: 8/10   Changes in function: Yes, see goal flow sheet for change in function   Adverse reactions: None;   ,         OBJECTIVE  Neurio exam is normal. Low back ROM is limited extension, rotation and SB. Global hip and lower extremity tightness. Global deceonditioned.     R shoulder capsular ROM limited. Weakness RTC.     Plan advance lumbar strength/stability, global endurance and supine RTC work shoudle r         ASSESSMENT/PLAN      Recommendations:  This patient would benefit from further evaluation.    Please refer to the daily flowsheet for treatment today, total treatment time and time spent performing 1:1 timed codes.

## 2022-12-22 ENCOUNTER — THERAPY VISIT (OUTPATIENT)
Dept: PHYSICAL THERAPY | Facility: CLINIC | Age: 72
End: 2022-12-22
Payer: COMMERCIAL

## 2022-12-22 DIAGNOSIS — M25.511 CHRONIC RIGHT SHOULDER PAIN: Primary | ICD-10-CM

## 2022-12-22 DIAGNOSIS — G89.29 CHRONIC LEFT-SIDED LOW BACK PAIN WITH LEFT-SIDED SCIATICA: ICD-10-CM

## 2022-12-22 DIAGNOSIS — G89.29 CHRONIC RIGHT SHOULDER PAIN: Primary | ICD-10-CM

## 2022-12-22 DIAGNOSIS — M54.42 CHRONIC LEFT-SIDED LOW BACK PAIN WITH LEFT-SIDED SCIATICA: ICD-10-CM

## 2022-12-22 PROCEDURE — 97110 THERAPEUTIC EXERCISES: CPT | Mod: GP

## 2023-01-05 ENCOUNTER — THERAPY VISIT (OUTPATIENT)
Dept: PHYSICAL THERAPY | Facility: CLINIC | Age: 73
End: 2023-01-05
Payer: COMMERCIAL

## 2023-01-05 DIAGNOSIS — G89.29 CHRONIC LEFT-SIDED LOW BACK PAIN WITH LEFT-SIDED SCIATICA: Primary | ICD-10-CM

## 2023-01-05 DIAGNOSIS — G89.29 CHRONIC BILATERAL LOW BACK PAIN WITH RIGHT-SIDED SCIATICA: ICD-10-CM

## 2023-01-05 DIAGNOSIS — M54.41 CHRONIC BILATERAL LOW BACK PAIN WITH RIGHT-SIDED SCIATICA: ICD-10-CM

## 2023-01-05 DIAGNOSIS — M54.42 CHRONIC LEFT-SIDED LOW BACK PAIN WITH LEFT-SIDED SCIATICA: Primary | ICD-10-CM

## 2023-01-05 PROCEDURE — 97012 MECHANICAL TRACTION THERAPY: CPT | Mod: GP | Performed by: PHYSICAL THERAPIST

## 2023-01-05 PROCEDURE — 97110 THERAPEUTIC EXERCISES: CPT | Mod: GP | Performed by: PHYSICAL THERAPIST

## 2023-01-11 ENCOUNTER — THERAPY VISIT (OUTPATIENT)
Dept: PHYSICAL THERAPY | Facility: CLINIC | Age: 73
End: 2023-01-11
Payer: COMMERCIAL

## 2023-01-11 DIAGNOSIS — G89.29 CHRONIC LEFT-SIDED LOW BACK PAIN WITH LEFT-SIDED SCIATICA: Primary | ICD-10-CM

## 2023-01-11 DIAGNOSIS — M54.41 CHRONIC BILATERAL LOW BACK PAIN WITH RIGHT-SIDED SCIATICA: ICD-10-CM

## 2023-01-11 DIAGNOSIS — G89.29 CHRONIC BILATERAL LOW BACK PAIN WITH RIGHT-SIDED SCIATICA: ICD-10-CM

## 2023-01-11 DIAGNOSIS — M54.42 CHRONIC LEFT-SIDED LOW BACK PAIN WITH LEFT-SIDED SCIATICA: Primary | ICD-10-CM

## 2023-01-11 PROCEDURE — 97012 MECHANICAL TRACTION THERAPY: CPT | Mod: GP

## 2023-01-11 PROCEDURE — 97530 THERAPEUTIC ACTIVITIES: CPT | Mod: GP

## 2023-01-11 NOTE — PROGRESS NOTES
Subjective:  The history is provided by the patient.     Patient presents for final visit prior to hip replacement surgery on 1/20/23. Patient requested traction to alleviate with LBP going into surgery next week as he has been finding temporary relief after previous sessions. Patient feels prepared and does not have many questions going into surgery next week.    Physical Exam    Objective:  System    Physical Exam  Hip Musculoskeletal Exam    Range of Motion    Right      Right hip active flexion: Limited due to pain.       Passive flexion: 90.       Passive internal rotation: 5.       Passive external rotation: 40.     Range of motion additional comments: Thoracolumbar AROM: limited into extension moderately with excessive hip and cervical mobility to compensate for thoracic and lumbar hypomobility.    Special Tests    Right      ZULLY test (right): positive      Impingement test: positive      Trendelenburg test: negative      Internal rotation: positive    Special tests additional comments: Scour: Positive for hip pain     General     ROS    Assessment/Plan:    DISCHARGE REPORT    Progress reporting period is from 9/13/22 to 1/11/23.       SUBJECTIVE  Subjective changes noted by patient: No significant change in functional activities including walking or exercise with regards to pain.   Current pain level is 5/10  .     Previous pain level was  8/10  .   Changes in function:  Yes (See Goal flowsheet attached for changes in current functional level)  Adverse reaction to treatment or activity: None    OBJECTIVE  Changes noted in objective findings:  Yes, See above.        ASSESSMENT/PLAN  Updated problem list and treatment plan: Diagnosis 1:  Right hip OA  Pain -  mechanical traction, manual therapy, education and home program  Decreased ROM/flexibility - manual therapy, therapeutic exercise and home program  Decreased strength - therapeutic exercise, therapeutic activities and home program  Impaired gait - gait  training and home program  Decreased function - therapeutic activities and home program  STG/LTGs have been met or progress has been made towards goals:  Yes (See Goal flow sheet completed today.)  Assessment of Progress: The patient's progress has plateaued and will be undergoing hip replacement surgery on 1/20/23.   I have re-evaluated this patient and find that the nature, scope, duration and intensity of the therapy is appropriate for the medical condition of the patient.  Guicho continues to require the following intervention to meet STG and LTG's:  PT following surgery on 1/20/23. Will be newly evaluated post-op.    Recommendations:  This patient would benefit from further evaluation following right hip surgery on 1/20/23.    Please refer to the daily flowsheet for treatment today, total treatment time and time spent performing 1:1 timed codes.

## 2023-03-20 ENCOUNTER — THERAPY VISIT (OUTPATIENT)
Dept: PHYSICAL THERAPY | Facility: CLINIC | Age: 73
End: 2023-03-20
Payer: COMMERCIAL

## 2023-03-20 DIAGNOSIS — Z96.649 AFTERCARE FOLLOWING HIP JOINT REPLACEMENT SURGERY, UNSPECIFIED LATERALITY: ICD-10-CM

## 2023-03-20 DIAGNOSIS — Z47.1 AFTERCARE FOLLOWING HIP JOINT REPLACEMENT SURGERY, UNSPECIFIED LATERALITY: ICD-10-CM

## 2023-03-20 DIAGNOSIS — M53.3 SI (SACROILIAC) JOINT DYSFUNCTION: ICD-10-CM

## 2023-03-20 DIAGNOSIS — M79.604 LUMBAR PAIN WITH RADIATION DOWN RIGHT LEG: ICD-10-CM

## 2023-03-20 DIAGNOSIS — M54.50 LUMBAR PAIN WITH RADIATION DOWN RIGHT LEG: ICD-10-CM

## 2023-03-20 PROCEDURE — 97161 PT EVAL LOW COMPLEX 20 MIN: CPT | Mod: GP

## 2023-03-20 PROCEDURE — 97110 THERAPEUTIC EXERCISES: CPT | Mod: GP

## 2023-03-20 ASSESSMENT — ACTIVITIES OF DAILY LIVING (ADL)
STEPPING_UP_AND_DOWN_CURBS: NO DIFFICULTY AT ALL
HOW_WOULD_YOU_RATE_YOUR_CURRENT_LEVEL_OF_FUNCTION_DURING_YOUR_USUAL_ACTIVITIES_OF_DAILY_LIVING_FROM_0_TO_100_WITH_100_BEING_YOUR_LEVEL_OF_FUNCTION_PRIOR_TO_YOUR_HIP_PROBLEM_AND_0_BEING_THE_INABILITY_TO_PERFORM_ANY_OF_YOUR_USUAL_DAILY_ACTIVITIES?: 90
SITTING_FOR_15_MINUTES: NO DIFFICULTY AT ALL
HEAVY_WORK: MODERATE DIFFICULTY
GETTING_INTO_AND_OUT_OF_AN_AVERAGE_CAR: SLIGHT DIFFICULTY
PUTTING_ON_SOCKS_AND_SHOES: NO DIFFICULTY AT ALL
RECREATIONAL_ACTIVITIES: UNABLE TO DO
WALKING_15_MINUTES_OR_GREATER: MODERATE DIFFICULTY
WALKING_APPROXIMATELY_10_MINUTES: NO DIFFICULTY AT ALL
ROLLING_OVER_IN_BED: SLIGHT DIFFICULTY
GETTING_INTO_AND_OUT_OF_A_BATHTUB: UNABLE TO DO
GOING_UP_1_FLIGHT_OF_STAIRS: NO DIFFICULTY AT ALL
LIGHT_TO_MODERATE_WORK: SLIGHT DIFFICULTY
WALKING_DOWN_STEEP_HILLS: SLIGHT DIFFICULTY
GOING_DOWN_1_FLIGHT_OF_STAIRS: NO DIFFICULTY AT ALL
WALKING_UP_STEEP_HILLS: SLIGHT DIFFICULTY
DEEP_SQUATTING: UNABLE TO DO
TWISTING/PIVOTING_ON_INVOLVED_LEG: SLIGHT DIFFICULTY

## 2023-03-20 NOTE — PROGRESS NOTES
Physical Therapy Initial Evaluation  Subjective:  The history is provided by the patient.   Therapist Generated HPI Evaluation  Problem details: Pt reports R hip is recovering well - would like guidance on home program exercises and technique, he also reports low back continues to be bothersome and would like to address this.       Posterior approach for R hip - at 8 weeks.  Reports no precautions with range of motion or restrictions.      Still uses cane on long weeks - uses it more for rest versus stability.  Can go for up to 15-20 minute outdoor walks.      Reports past pain was R posterior hip - low back still results in some regular pain in R SIJ region and lumbar region.  Denies frequent referred pain to R thigh/leg - feels this has decreased significantly.     He has stopped his exercise program over the past week to let his hip heal - but would like guidance if he should restart his program.    Does do nearly weekly chiropractic sessions as well.     Low back - multiple SI joint injections.    Goal is to improve core strength/twisting ability, and continue hip strengthening/program..         Type of problem:  Right hip.                       Previous treatment includes physical therapy. There was moderate and mild improvement following previous treatment.      Patient Health History  Guicho Pratt being seen for R BLACK, low back.     Problem began: 1/20/2023.   Problem occurred: age and deterioration   Pain is reported as 3/10 on pain scale.  General health as reported by patient is good.  Pertinent medical history includes: asthma, cancer, depression, heart problems, high blood pressure, implanted device, incontinence, kidney disease, osteoporosis, overweight and sleep disorder/apnea.   Red flags:  Calf pain-swelling-warmth, chest pain, cold/hot extremity, pain at rest/night and significant weakness.     Surgeries include:  Orthopedic surgery and heart surgery. Other surgery history details: L TKA, Heart  transplate 2015; prostate surgery .    Current medications:  Anti-depressants, anti-inflammatory, bone density, cardiac medication, high blood pressure medication and pain medication.    Current occupation is retired.   Primary job tasks include:  Prolonged sitting.                                    Objective:  System    Physical Exam    General     ROS      HIP EVALUATION    Static Posture  Trunk and hip flexion     Dynamic Movement Screen  Gait: No significant findings, HIP/TRUNK flexion posture with gait, symmetric step length    Range of Motion  Lumbar spine screen: flexion minimal loss, extension maximal loss       Hip ROM Left Right   Flexion 110 105   ER 50 40   IR 10 15           Flexibility Left Right   Quadriceps     Hamstrings moderate moderate   Ankle     Figure 4       Hip and Knee Strength   MMT Left Right   Hip Abduction 3/5 3-/5   Hip Extension 4/5 4/5   Hip ER 4/5 4/5          Special Tests   FADIR SLR Slump   Left Negative Negative Positive   Right Negative Negative Positive     Palpation  Left: Not assessed  Right: No tenderness to palpation    Assessment/Plan:  Patient is a 72 year old male with lumbar and right side hip complaints.      Guicho presents to PT 8 weeks post R BLACK posterior approach, with deficits in mobility, strength, and function.  In addition, he has significant history of lumbar/SI joint conditions in which he would benefit from skilled PT to address global deconditioning of trunk/lower extremities, as well as emphasis on lumbar/SI jt interventions to reduce pain that patient experiences.        Patient has the following significant findings with corresponding treatment plan.                Diagnosis 1:  R total hip replacement  Diagnosis 2:  Low back pain with R radicular symptoms       Pain -  hot/cold therapy, mechanical traction, manual therapy, splint/taping/bracing/orthotics, self management, education and home program  Decreased ROM/flexibility - manual therapy,  therapeutic exercise and home program  Decreased joint mobility - manual therapy, therapeutic exercise and home program  Decreased strength - therapeutic exercise, therapeutic activities and home program  Impaired balance - neuro re-education, therapeutic activities and home program  Decreased proprioception - neuro re-education, therapeutic activities and home program  Impaired muscle performance - neuro re-education and home program  Decreased function - therapeutic activities and home program  Impaired posture - neuro re-education and home program    Therapy Evaluation Codes:   Cumulative Therapy Evaluation is: Low complexity.    Previous and current functional limitations:  (See Goal Flow Sheet for this information)    Short term and Long term goals: (See Goal Flow Sheet for this information)     Communication ability:  Patient appears to be able to clearly communicate and understand verbal and written communication and follow directions correctly.  Treatment Explanation - The following has been discussed with the patient:   RX ordered/plan of care  Anticipated outcomes  Possible risks and side effects  This patient would benefit from PT intervention to resume normal activities.   Rehab potential is good.    Frequency:  2 X month, once daily  Duration:  for 5 months  Discharge Plan:  Achieve all LTG.  Independent in home treatment program.  Reach maximal therapeutic benefit.    Please refer to the daily flowsheet for treatment today, total treatment time and time spent performing 1:1 timed codes.

## 2023-03-20 NOTE — PROGRESS NOTES
"                                                                           [facility]    OUTPATIENT Physical Therapy ORTHOPEDIC EVALUATION  PLAN OF TREATMENT FOR OUTPATIENT REHABILITATION  (COMPLETE FOR INITIAL CLAIMS ONLY)  Patient's Last Name, First Name, M.I.  YOB: 1950  Guicho Pratt    Provider s Name:  [facility]   Medical Record No.  [id number]   Start of Care Date:      Onset Date:  01/20/23    Treatment Diagnosis:  R BLACK posterior approach Medical Diagnosis:  Data Unavailable       Goals:     03/20/23 0500   Body Part   Goals listed below are for R hip and back   Goal #1   Goal #1 squatting/kneeling   Previous Functional Level Could squat   (6/10 pain, UE for pushoff)   Current Functional Level Can do a partial squat  (pushoff from chair)   STG Target Performance Do a partial squat   Performance Level arms crossed squat 5x STS in 25\" from standard chair   Rationale for job requirements in their work place;for proper body mechanics while performing housework;for proper body mechanics while performing yardwork and home maintenance;for proper body mechanics when lifting, personal hygiene, dressing   Due date 04/24/23   LTG Target Performance Do a full squat   Performance Level 5x STS arms cross standard chair in 15\"   Rationale for proper body mechanics while performing housework;for job requirements in their work place;for proper body mechanics while performing yardwork and home maintenance;for proper body mechanics when lifting, personal hygiene, dressing   Due date 08/07/23         Therapy Frequency:  2x/month  Predicted Duration of Therapy Intervention:  5 months    Carlitos Selby, PT                 I CERTIFY THE NEED FOR THESE SERVICES FURNISHED UNDER        THIS PLAN OF TREATMENT AND WHILE UNDER MY CARE .             Physician Signature               Date    X_____________________________________________________             "             Certification Date From:  03/20/23   Certification Date To:  06/18/23    Referring Provider:  Gustavo Brooke    Initial Assessment        See Epic Evaluation

## 2023-04-24 ENCOUNTER — THERAPY VISIT (OUTPATIENT)
Dept: PHYSICAL THERAPY | Facility: CLINIC | Age: 73
End: 2023-04-24
Payer: COMMERCIAL

## 2023-04-24 DIAGNOSIS — M79.604 LUMBAR PAIN WITH RADIATION DOWN RIGHT LEG: ICD-10-CM

## 2023-04-24 DIAGNOSIS — Z96.649 AFTERCARE FOLLOWING HIP JOINT REPLACEMENT SURGERY, UNSPECIFIED LATERALITY: Primary | ICD-10-CM

## 2023-04-24 DIAGNOSIS — M53.3 SI (SACROILIAC) JOINT DYSFUNCTION: ICD-10-CM

## 2023-04-24 DIAGNOSIS — M54.50 LUMBAR PAIN WITH RADIATION DOWN RIGHT LEG: ICD-10-CM

## 2023-04-24 DIAGNOSIS — Z47.1 AFTERCARE FOLLOWING HIP JOINT REPLACEMENT SURGERY, UNSPECIFIED LATERALITY: Primary | ICD-10-CM

## 2023-04-24 PROCEDURE — 97014 ELECTRIC STIMULATION THERAPY: CPT | Mod: GP

## 2023-04-24 PROCEDURE — 97110 THERAPEUTIC EXERCISES: CPT | Mod: GP

## 2023-04-24 PROCEDURE — 97530 THERAPEUTIC ACTIVITIES: CPT | Mod: GP

## 2023-05-08 ENCOUNTER — THERAPY VISIT (OUTPATIENT)
Dept: PHYSICAL THERAPY | Facility: CLINIC | Age: 73
End: 2023-05-08
Payer: COMMERCIAL

## 2023-05-08 DIAGNOSIS — Z47.1 AFTERCARE FOLLOWING HIP JOINT REPLACEMENT SURGERY, UNSPECIFIED LATERALITY: Primary | ICD-10-CM

## 2023-05-08 DIAGNOSIS — M53.3 SI (SACROILIAC) JOINT DYSFUNCTION: ICD-10-CM

## 2023-05-08 DIAGNOSIS — M54.50 LUMBAR PAIN WITH RADIATION DOWN RIGHT LEG: ICD-10-CM

## 2023-05-08 DIAGNOSIS — Z96.649 AFTERCARE FOLLOWING HIP JOINT REPLACEMENT SURGERY, UNSPECIFIED LATERALITY: Primary | ICD-10-CM

## 2023-05-08 DIAGNOSIS — M79.604 LUMBAR PAIN WITH RADIATION DOWN RIGHT LEG: ICD-10-CM

## 2023-05-08 PROCEDURE — 97014 ELECTRIC STIMULATION THERAPY: CPT | Mod: GP

## 2023-05-08 PROCEDURE — 97110 THERAPEUTIC EXERCISES: CPT | Mod: GP

## 2023-05-08 PROCEDURE — 97140 MANUAL THERAPY 1/> REGIONS: CPT | Mod: GP

## 2023-05-08 NOTE — PROGRESS NOTES
Subjective:  HPI  Physical Exam                    Objective:  System    Physical Exam    General     ROS    Assessment/Plan:    PROGRESS  REPORT    Progress reporting period is from 3/20/23 to 5/8/23.       Guicho demonstrates gradual progress with initiation of PT.  He has been seen for three sessions, in which his strength and range of motion of the lumbar and hip region are slowly improving.  Expecting improved progress over the next 6-8 weeks as Guicho's status was impaired by family illness responsibilities.    SUBJECTIVE  Subjective: (P) Guicho reports R SI joint still bothering him quite often and not noticing much change so far, however extension range of motion is improving and helping. He states he has been assisting with grandchildren for last few weeks due to family illness so he has spent a significant amount of time in car driving them to school/activities.       Current Pain level: 5/10.      Initial Pain level: 6/10.   Changes in function:  Yes (See Goal flowsheet attached for changes in current functional level)  Adverse reaction to treatment or activity: None    OBJECTIVE  Changes noted in objective findings:  Yes  Objective: LAROM - flexion mod loss, ext max loss at beginning of session. Improved extension to mod loss after mobs and reduced pain with extension from 5/10 to 3/10.     ASSESSMENT/PLAN  Updated problem list and treatment plan: Diagnosis 1:   SI joint pain with following R hip BLACK   Pain -  hot/cold therapy, electric stimulation, manual therapy, self management, education, and home program  Decreased ROM/flexibility - manual therapy, therapeutic exercise, and home program  Decreased joint mobility - manual therapy, therapeutic exercise, and home program  Decreased strength - therapeutic exercise, therapeutic activities, and home program  Decreased proprioception - neuro re-education, therapeutic activities, and home program  Impaired muscle performance - neuro re-education and home  program  Decreased function - therapeutic activities and home program  Impaired posture - neuro re-education and home program  STG/LTGs have been met or progress has been made towards goals:  Yes (See Goal flow sheet completed today.)  Assessment of Progress: The patient's condition is improving.  The patient's condition has potential to improve.  Patient is meeting short term goals and is progressing towards long term goals.  Self Management Plans:  Patient has been instructed in a home treatment program.  Patient  has been instructed in self management of symptoms.  I have re-evaluated this patient and find that the nature, scope, duration and intensity of the therapy is appropriate for the medical condition of the patient.  Guicho continues to require the following intervention to meet STG and LTG's:  PT    Recommendations:  This patient would benefit from continued therapy.     Frequency:  2 X a month, once daily  Duration:  for 3 months    Please refer to the daily flowsheet for treatment today, total treatment time and time spent performing 1:1 timed codes.

## 2023-05-22 ENCOUNTER — THERAPY VISIT (OUTPATIENT)
Dept: PHYSICAL THERAPY | Facility: CLINIC | Age: 73
End: 2023-05-22
Payer: COMMERCIAL

## 2023-05-22 DIAGNOSIS — Z47.1 AFTERCARE FOLLOWING HIP JOINT REPLACEMENT SURGERY, UNSPECIFIED LATERALITY: Primary | ICD-10-CM

## 2023-05-22 DIAGNOSIS — M79.604 LUMBAR PAIN WITH RADIATION DOWN RIGHT LEG: ICD-10-CM

## 2023-05-22 DIAGNOSIS — M53.3 SI (SACROILIAC) JOINT DYSFUNCTION: ICD-10-CM

## 2023-05-22 DIAGNOSIS — M54.50 LUMBAR PAIN WITH RADIATION DOWN RIGHT LEG: ICD-10-CM

## 2023-05-22 DIAGNOSIS — Z96.649 AFTERCARE FOLLOWING HIP JOINT REPLACEMENT SURGERY, UNSPECIFIED LATERALITY: Primary | ICD-10-CM

## 2023-05-22 PROCEDURE — 97110 THERAPEUTIC EXERCISES: CPT | Mod: GP

## 2023-05-22 PROCEDURE — 97032 APPL MODALITY 1+ESTIM EA 15: CPT | Mod: GP

## 2023-07-25 PROBLEM — M53.3 SI (SACROILIAC) JOINT DYSFUNCTION: Status: RESOLVED | Noted: 2023-03-20 | Resolved: 2023-07-25

## 2023-07-25 PROBLEM — M79.604 LUMBAR PAIN WITH RADIATION DOWN RIGHT LEG: Status: RESOLVED | Noted: 2023-03-20 | Resolved: 2023-07-25

## 2023-07-25 PROBLEM — Z96.649 AFTERCARE FOLLOWING HIP JOINT REPLACEMENT SURGERY, UNSPECIFIED LATERALITY: Status: RESOLVED | Noted: 2023-03-20 | Resolved: 2023-07-25

## 2023-07-25 PROBLEM — Z47.1 AFTERCARE FOLLOWING HIP JOINT REPLACEMENT SURGERY, UNSPECIFIED LATERALITY: Status: RESOLVED | Noted: 2023-03-20 | Resolved: 2023-07-25

## 2023-07-25 PROBLEM — M54.50 LUMBAR PAIN WITH RADIATION DOWN RIGHT LEG: Status: RESOLVED | Noted: 2023-03-20 | Resolved: 2023-07-25

## 2023-07-25 NOTE — PROGRESS NOTES
Patient did not return for further treatment and no additional progress was noted.  Please refer to the progress note and goal flowsheet completed for discharge information.

## 2023-11-25 ENCOUNTER — HEALTH MAINTENANCE LETTER (OUTPATIENT)
Age: 73
End: 2023-11-25

## 2024-04-10 ENCOUNTER — TRANSCRIBE ORDERS (OUTPATIENT)
Dept: OTHER | Age: 74
End: 2024-04-10

## 2024-04-10 DIAGNOSIS — G89.29 CHRONIC RIGHT SHOULDER PAIN: Primary | ICD-10-CM

## 2024-04-10 DIAGNOSIS — M25.511 CHRONIC RIGHT SHOULDER PAIN: Primary | ICD-10-CM

## 2024-04-10 DIAGNOSIS — M19.011 ARTHROSIS OF RIGHT ACROMIOCLAVICULAR JOINT: ICD-10-CM

## 2024-04-10 DIAGNOSIS — M75.101 TEAR OF RIGHT ROTATOR CUFF, UNSPECIFIED TEAR EXTENT, UNSPECIFIED WHETHER TRAUMATIC: ICD-10-CM

## 2024-04-10 DIAGNOSIS — M75.121 NONTRAUMATIC COMPLETE TEAR OF RIGHT ROTATOR CUFF: ICD-10-CM

## 2024-06-04 ENCOUNTER — THERAPY VISIT (OUTPATIENT)
Dept: PHYSICAL THERAPY | Facility: CLINIC | Age: 74
End: 2024-06-04
Attending: ORTHOPAEDIC SURGERY
Payer: COMMERCIAL

## 2024-06-04 DIAGNOSIS — M25.511 CHRONIC RIGHT SHOULDER PAIN: Primary | ICD-10-CM

## 2024-06-04 DIAGNOSIS — G89.29 CHRONIC RIGHT SHOULDER PAIN: Primary | ICD-10-CM

## 2024-06-04 PROCEDURE — 97110 THERAPEUTIC EXERCISES: CPT | Mod: GP | Performed by: PHYSICAL THERAPIST

## 2024-06-04 PROCEDURE — 97161 PT EVAL LOW COMPLEX 20 MIN: CPT | Mod: GP | Performed by: PHYSICAL THERAPIST

## 2024-06-04 ASSESSMENT — ACTIVITIES OF DAILY LIVING (ADL)
REMOVING_SOMETHING_FROM_YOUR_BACK_POCKET: 0
WHEN_LYING_ON_THE_INVOLVED_SIDE: 7
PLACING_AN_OBJECT_ON_A_HIGH_SHELF: 5
WASHING_YOUR_HAIR?: 0
AT_ITS_WORST?: 7
PLEASE_INDICATE_YOR_PRIMARY_REASON_FOR_REFERRAL_TO_THERAPY:: SHOULDER
TOUCHING_THE_BACK_OF_YOUR_NECK: 5
REACHING_FOR_SOMETHING_ON_A_HIGH_SHELF: 8
CARRYING_A_HEAVY_OBJECT_OF_10_POUNDS: 7
PUTTING_ON_AN_UNDERSHIRT_OR_A_PULLOVER_SWEATER: 0
PUTTING_ON_YOUR_PANTS: 0
PUSHING_WITH_THE_INVOLVED_ARM: 8
PUTTING_ON_A_SHIRT_THAT_BUTTONS_DOWN_THE_FRONT: 0
WASHING_YOUR_BACK: 2

## 2024-06-11 ENCOUNTER — THERAPY VISIT (OUTPATIENT)
Dept: PHYSICAL THERAPY | Facility: CLINIC | Age: 74
End: 2024-06-11
Attending: ORTHOPAEDIC SURGERY
Payer: COMMERCIAL

## 2024-06-11 DIAGNOSIS — M25.511 CHRONIC RIGHT SHOULDER PAIN: Primary | ICD-10-CM

## 2024-06-11 DIAGNOSIS — G89.29 CHRONIC RIGHT SHOULDER PAIN: Primary | ICD-10-CM

## 2024-06-11 PROCEDURE — 97110 THERAPEUTIC EXERCISES: CPT | Mod: GP | Performed by: PHYSICAL THERAPIST

## 2024-06-11 PROCEDURE — 97032 APPL MODALITY 1+ESTIM EA 15: CPT | Mod: GP | Performed by: PHYSICAL THERAPIST

## 2024-06-12 NOTE — PROGRESS NOTES
PHYSICAL THERAPY EVALUATION  Type of Visit: Evaluation    Guicho is a pleasant 72 yo male referred to physical therapy for R Chronic Shoulder pain, RTC Deficient, ACJ arthrosis under the direction of Dr. Polo Mojica for up to 10 visits.     See electronic medical record for Abuse and Falls Screening details.    Subjective       Presenting condition or subjective complaint: Referral from Dr. Mojica--[facility], R chronic shoulder pain   Date of onset:      Relevant medical history: Anemia; Arthritis; Bladder or bowel problems; Cancer; Depression; Fibromyalgia; Hearing problems; Heart problems; High blood pressure; Implanted device; Incontinence; Kidney disease; Mental Illness; Osteoporosis; Overweight; Pacemaker; Pain at night or rest; Severe headaches; Sleep disorder like apnea; Vision problems   Dates & types of surgery: Have list    Prior diagnostic imaging/testing results: X-ray     Prior therapy history for the same diagnosis, illness or injury: Yes PT at this office    Prior Level of Function  Transfers: Independent  Ambulation: Independent  ADL: Independent      Living Environment  Social support: With a significant other or spouse   Type of home: House   Stairs to enter the home: Yes 2 Is there a railing: No   Ramp: No   Stairs inside the home: Yes 8 Is there a railing: Yes   Help at home: Self Cares (home health aide/personal care attendant, family, etc); Medication and/or finances  Equipment owned: Grab bars; Raised toilet seat     Employment: No    Hobbies/Interests: Yard work/watching sports/travel/boating/[employer]scar [employer] toy collecting/[employer]/spending time at [employer]    Patient goals for therapy: Decrease pain/improve range of motion    Pain assessment: Location: R anterior, superior and lateral shoulder pain /Ratin/10 intermit      Objective   SHOULDER EVALUATION  PAIN: Pain is Exacerbated By: reaching, lifting weight and sleeping on the R side   Pain is Relieved By: cold, heat, otc medications, and  rest  INTEGUMENTARY (edema, incisions): WNL  POSTURE:  fair, forward head, rounded shoulders     ROM:   (Degrees) Left AROM Left PROM Right AROM  Right PROM   Shoulder Flexion 165 170  150 155   Shoulder Extension       Shoulder Abduction       Shoulder Adduction 150  120    Shoulder Internal Rotation T10 90 T12 80   Shoulder External Rotation  70  50     STRENGTH:  weak and painful all MMT direction     SPECIAL TESTS:  (+) Neer, Triplett, Speeds   PALPATION:  ACJ, LHB, (+)     CERVICAL SCREEN:  R cx rotation and forward flexion reproduces R neck and shoulder pain     Assessment & Plan   CLINICAL IMPRESSIONS  Medical Diagnosis:      Treatment Diagnosis:     Impression/Assessment: Patient is a 73 year old male with chronic R shoulder and neck pain  complaints.  The following significant findings have been identified: Pain, Decreased ROM/flexibility, and Decreased strength. These impairments interfere with their ability to perform self care tasks, household chores, driving , and household mobility as compared to previous level of function.     Clinical Decision Making (Complexity):  Clinical Presentation: Stable/Uncomplicated  Clinical Presentation Rationale: based on medical and personal factors listed in PT evaluation  Clinical Decision Making (Complexity): Low complexity    PLAN OF CARE  Treatment Interventions:  Modalities: Cryotherapy  Interventions: Manual Therapy, Neuromuscular Re-education, Therapeutic Exercise, Self-Care/Home Management    Long Term Goals            Frequency of Treatment:    Duration of Treatment:      Education Assessment:        Risks and benefits of evaluation/treatment have been explained.   Patient/Family/caregiver agrees with Plan of Care.     Evaluation Time:             Signing Clinician: Hilario Najera PT      [facility]                                                                                   OUTPATIENT PHYSICAL THERAPY      PLAN OF TREATMENT  FOR OUTPATIENT REHABILITATION   Patient's Last Name, First Name, JAYCEE SeamandavidGuicho  NEELA YOB: 1950   Provider's Name   [facility]   Medical Record No.  [id number]     Onset Date:   4/10/2024 Start of Care Date:  6/4/2024     Medical Diagnosis:   R Chronic Shoulder pain       PT Treatment Diagnosis:   R Chronic Shoulder pain  Plan of Treatment  Frequency/Duration:  1x/week /  12 weeks     Certification date from  6/4/2024 to  8/27/2024         See note for plan of treatment details and functional goals     Hilario Najera, PT                         I CERTIFY THE NEED FOR THESE SERVICES FURNISHED UNDER        THIS PLAN OF TREATMENT AND WHILE UNDER MY CARE .             Physician Signature               Date    X_____________________________________________________                  Referring Provider:  Carmine Mojica    Initial Assessment  See Epic Evaluation-

## 2024-06-18 ENCOUNTER — THERAPY VISIT (OUTPATIENT)
Dept: PHYSICAL THERAPY | Facility: CLINIC | Age: 74
End: 2024-06-18
Attending: ORTHOPAEDIC SURGERY
Payer: COMMERCIAL

## 2024-06-18 DIAGNOSIS — Z47.1 AFTERCARE FOLLOWING HIP JOINT REPLACEMENT SURGERY, UNSPECIFIED LATERALITY: ICD-10-CM

## 2024-06-18 DIAGNOSIS — Z96.649 AFTERCARE FOLLOWING HIP JOINT REPLACEMENT SURGERY, UNSPECIFIED LATERALITY: ICD-10-CM

## 2024-06-18 DIAGNOSIS — G89.29 CHRONIC RIGHT SHOULDER PAIN: Primary | ICD-10-CM

## 2024-06-18 DIAGNOSIS — M25.511 CHRONIC RIGHT SHOULDER PAIN: Primary | ICD-10-CM

## 2024-06-18 PROCEDURE — 97110 THERAPEUTIC EXERCISES: CPT | Mod: GP | Performed by: PHYSICAL THERAPIST

## 2024-06-18 PROCEDURE — 97140 MANUAL THERAPY 1/> REGIONS: CPT | Mod: GP | Performed by: PHYSICAL THERAPIST

## 2024-06-18 PROCEDURE — 97032 APPL MODALITY 1+ESTIM EA 15: CPT | Mod: GP | Performed by: PHYSICAL THERAPIST

## 2024-07-09 ENCOUNTER — THERAPY VISIT (OUTPATIENT)
Dept: PHYSICAL THERAPY | Facility: CLINIC | Age: 74
End: 2024-07-09
Payer: COMMERCIAL

## 2024-07-09 DIAGNOSIS — G89.29 CHRONIC RIGHT SHOULDER PAIN: Primary | ICD-10-CM

## 2024-07-09 DIAGNOSIS — M25.511 CHRONIC RIGHT SHOULDER PAIN: Primary | ICD-10-CM

## 2024-07-09 PROCEDURE — 97110 THERAPEUTIC EXERCISES: CPT | Mod: GP | Performed by: PHYSICAL THERAPIST

## 2024-07-09 PROCEDURE — 97140 MANUAL THERAPY 1/> REGIONS: CPT | Mod: GP | Performed by: PHYSICAL THERAPIST

## 2024-07-09 PROCEDURE — 97032 APPL MODALITY 1+ESTIM EA 15: CPT | Mod: GP | Performed by: PHYSICAL THERAPIST

## 2024-07-23 ENCOUNTER — THERAPY VISIT (OUTPATIENT)
Dept: PHYSICAL THERAPY | Facility: CLINIC | Age: 74
End: 2024-07-23
Payer: COMMERCIAL

## 2024-07-23 DIAGNOSIS — Z96.649 AFTERCARE FOLLOWING HIP JOINT REPLACEMENT SURGERY, UNSPECIFIED LATERALITY: ICD-10-CM

## 2024-07-23 DIAGNOSIS — G89.29 CHRONIC RIGHT SHOULDER PAIN: Primary | ICD-10-CM

## 2024-07-23 DIAGNOSIS — Z47.1 AFTERCARE FOLLOWING HIP JOINT REPLACEMENT SURGERY, UNSPECIFIED LATERALITY: ICD-10-CM

## 2024-07-23 DIAGNOSIS — M25.511 CHRONIC RIGHT SHOULDER PAIN: Primary | ICD-10-CM

## 2024-07-23 PROCEDURE — 97140 MANUAL THERAPY 1/> REGIONS: CPT | Mod: GP | Performed by: PHYSICAL THERAPIST

## 2024-07-23 PROCEDURE — 97032 APPL MODALITY 1+ESTIM EA 15: CPT | Mod: GP | Performed by: PHYSICAL THERAPIST

## 2024-07-23 PROCEDURE — 97110 THERAPEUTIC EXERCISES: CPT | Mod: GP | Performed by: PHYSICAL THERAPIST

## 2024-08-06 ENCOUNTER — THERAPY VISIT (OUTPATIENT)
Dept: PHYSICAL THERAPY | Facility: CLINIC | Age: 74
End: 2024-08-06
Payer: COMMERCIAL

## 2024-08-06 DIAGNOSIS — Z96.649 AFTERCARE FOLLOWING HIP JOINT REPLACEMENT SURGERY, UNSPECIFIED LATERALITY: ICD-10-CM

## 2024-08-06 DIAGNOSIS — G89.29 CHRONIC RIGHT SHOULDER PAIN: Primary | ICD-10-CM

## 2024-08-06 DIAGNOSIS — M25.511 CHRONIC RIGHT SHOULDER PAIN: Primary | ICD-10-CM

## 2024-08-06 DIAGNOSIS — Z47.1 AFTERCARE FOLLOWING HIP JOINT REPLACEMENT SURGERY, UNSPECIFIED LATERALITY: ICD-10-CM

## 2024-08-06 PROCEDURE — 97032 APPL MODALITY 1+ESTIM EA 15: CPT | Mod: GP | Performed by: PHYSICAL THERAPIST

## 2024-08-06 PROCEDURE — 97110 THERAPEUTIC EXERCISES: CPT | Mod: GP | Performed by: PHYSICAL THERAPIST

## 2024-08-06 NOTE — PROGRESS NOTES
PLAN  Continue therapy per current plan of care.     08/06/24 0500   Appointment Info   Signing clinician's name / credentials Akhil Najera PT AT[facility] Cert MDt   Total/Authorized Visits 10   Visits Used 6   Medical Diagnosis R chronic shoulde rpain, RTC deficiency, OA,   PT Tx Diagnosis R shoudler pain, OA, Scap dyskinesia   Other pertinent information MD order supine RTC and scap retraction ( low row)   Progress Note/Certification   Start of Care Date 06/04/24   Onset of illness/injury or Date of Surgery 04/10/24   Therapy Frequency 1x/week   Predicted Duration 12 weeks   Certification date from 06/04/24   Certification date to 08/27/24   Progress Note Due Date 08/13/24   Progress Note Completed Date 08/06/24       Present No   PT Goal 1   Goal Identifier reaching, sleeping, lifting are primary limits   Goal Description sleep through the night without pain   Rationale to maximize safety and independence with performance of ADLs and functional tasks;to maximize safety and independence within the home  (normal sleep pattern)   Target Date 08/27/24   Subjective Report   Subjective Report Visit #6.Patient on and off with R shoudler pain. Last week had episode of heat illness.. Patient is considering joint relief center for injection therapy, advaised against by Dr. Mojica, patient still wants to explore options.. Patient has been using francensence and MUR for pain releif . SPADI has reduced to 31/100 vs 37 1 month ago. Best response is supine reverse pendulum and ceiling punch. Stay the course with modalities for pain management as he established control with shoudler exercises. Follow up weekly x 3-4 weeks   Objective Measures   Objective Measures Objective Measure 1;Objective Measure 2;Objective Measure 3   Objective Measure 1   Objective Measure SPADI 31/100   Objective Measure 2   Objective Measure ROM   Details ER 30, IR 90, Flexion 140   Objective Measure 3   Objective Measure strength/ NM  control   PT Modalities   PT Modalities Electrical Stimulation;Cryotherapy   Cryotherapy   Treatment Detail R UT/Levator e-stim and CP   Patient Response/Progress painrelief   Electrical Stimulation   Electrical Stimulation (Attended) Minutes (05101) 15   Treatment Detail same time as the hot pack  to R levator and UT   Patient Response/Progress decrease discomfort   Hot Pack   Location pre- treatment x 10 min R shoulder and post treatment   Treatment Interventions (PT)   Interventions Therapeutic Procedure/Exercise;Neuromuscular Re-education;Manual Therapy   Therapeutic Procedure/Exercise   Therapeutic Procedures: strength, endurance, ROM, flexibility minutes (85419) 30   Therapeutic Procedures Ther Proc 2;Ther Proc 3;Ther Proc 4;Ther Proc 5;Ther Proc 6;Ther Proc 7   Ther Proc 1 ceiling punch   Ther Proc 1 - Details x 30-40,  10 ounces   Ther Proc 2 reverse pendulum   Ther Proc 2 - Details x 30-40 5 ounces   Ther Proc 3 scap retraction ( low Row) Cable   Ther Proc 3 - Details 3 plates 2 x 10   Ther Proc 4 standing abduction isometric   Ther Proc 4 - Details x 10 , 5 sec hold   Ther Proc 5 standing Adduction   Ther Proc 5 - Details x 10   Ther Proc 7 Alligator 3 x 30 sec   Skilled Intervention manual and verbl cuing   Patient Response/Progress fatigue   Manual Therapy   Manual Therapy Manual Therapy 2;Manual Therapy 3   Manual Therapy 1 UT/levator massage , deep tissue and TP   Manual Therapy 1 - Details x 10-15   Education   Learner/Method Patient;Demonstration;Pictures/Video;No Barriers to Learning   Plan   Home program see pT rx   Plan for next session pec and thoracic spine mobility   Comments   Comments follow MD order, stay conservative rx   Total Session Time   Timed Code Treatment Minutes 45   Total Treatment Time (sum of timed and untimed services) 45           Referring Provider:  Carmine Mojica

## 2024-08-13 ENCOUNTER — THERAPY VISIT (OUTPATIENT)
Dept: PHYSICAL THERAPY | Facility: CLINIC | Age: 74
End: 2024-08-13
Payer: COMMERCIAL

## 2024-08-13 DIAGNOSIS — G89.29 CHRONIC RIGHT SHOULDER PAIN: Primary | ICD-10-CM

## 2024-08-13 DIAGNOSIS — M25.511 CHRONIC RIGHT SHOULDER PAIN: Primary | ICD-10-CM

## 2024-08-13 PROCEDURE — 97032 APPL MODALITY 1+ESTIM EA 15: CPT | Mod: GP | Performed by: PHYSICAL THERAPIST

## 2024-08-13 PROCEDURE — 97140 MANUAL THERAPY 1/> REGIONS: CPT | Mod: GP | Performed by: PHYSICAL THERAPIST

## 2024-08-13 PROCEDURE — 97110 THERAPEUTIC EXERCISES: CPT | Mod: GP | Performed by: PHYSICAL THERAPIST

## 2024-08-27 ENCOUNTER — THERAPY VISIT (OUTPATIENT)
Dept: PHYSICAL THERAPY | Facility: CLINIC | Age: 74
End: 2024-08-27
Payer: COMMERCIAL

## 2024-08-27 DIAGNOSIS — G89.29 CHRONIC RIGHT SHOULDER PAIN: Primary | ICD-10-CM

## 2024-08-27 DIAGNOSIS — Z47.1 AFTERCARE FOLLOWING HIP JOINT REPLACEMENT SURGERY, UNSPECIFIED LATERALITY: ICD-10-CM

## 2024-08-27 DIAGNOSIS — M25.511 CHRONIC RIGHT SHOULDER PAIN: Primary | ICD-10-CM

## 2024-08-27 DIAGNOSIS — Z96.649 AFTERCARE FOLLOWING HIP JOINT REPLACEMENT SURGERY, UNSPECIFIED LATERALITY: ICD-10-CM

## 2024-08-27 PROCEDURE — 97112 NEUROMUSCULAR REEDUCATION: CPT | Mod: GP | Performed by: PHYSICAL THERAPIST

## 2024-08-27 PROCEDURE — 97110 THERAPEUTIC EXERCISES: CPT | Mod: GP | Performed by: PHYSICAL THERAPIST

## 2024-08-27 NOTE — PROGRESS NOTES
08/27/24 0500   Appointment Info   Signing clinician's name / credentials Akhil Najera PT ATc Cert MDt   Total/Authorized Visits 10   Visits Used 8   Medical Diagnosis R chronic shoulde rpain, RTC deficiency, OA,   PT Tx Diagnosis R shoudler pain, OA, Scap dyskinesia   Other pertinent information MD order supine RTC and scap retraction ( low row)   Progress Note/Certification   Start of Care Date 06/04/24   Onset of illness/injury or Date of Surgery 04/10/24   Therapy Frequency 1x/week   Predicted Duration 12 weeks   Certification date from 08/28/24   Certification date to 11/19/24   Progress Note Due Date 11/05/24       Present No   PT Goal 1   Goal Identifier reaching, sleeping, lifting are primary limits   Goal Description sleep through the night without pain   Rationale to maximize safety and independence with performance of ADLs and functional tasks;to maximize safety and independence within the home  (normal sleep pattern)   Target Date 08/27/24   Subjective Report   Subjective Report Visit #8. Pain is intermittant. On and off with activity, sometimes the ability to reach ok and then others really painful and non functional. Goal at thi spoint is really back down reps and stress with supine RTC with deltoid emphasis and gentle 4 corner ROM   Objective Measures   Objective Measures Objective Measure 1;Objective Measure 2;Objective Measure 3   Objective Measure 1   Objective Measure SPADI 31/100   Objective Measure 2   Objective Measure ROM   Details ER 30, IR 90, Flexion 140   Objective Measure 3   Objective Measure strength/ NM control   Details RTc weakness ( deficient RTC) , scap hike   PT Modalities   PT Modalities Electrical Stimulation;Cryotherapy   Cryotherapy   Treatment Detail R UT/Levator e-stim and CP   Patient Response/Progress painrelief   Electrical Stimulation   Electrical Stimulation (Attended) Minutes (72547) 15   Treatment Detail same time as the hot pack  to R levator  and UT   Patient Response/Progress decrease discomfort   Hot Pack   Location pre- treatment x 10 min R shoulder and post treatment   Treatment Interventions (PT)   Interventions Therapeutic Procedure/Exercise;Neuromuscular Re-education;Manual Therapy   Therapeutic Procedure/Exercise   Therapeutic Procedures: strength, endurance, ROM, flexibility minutes (08543) 15   Therapeutic Procedures Ther Proc 2;Ther Proc 3;Ther Proc 4;Ther Proc 5;Ther Proc 6;Ther Proc 7   Ther Proc 1 4 corner ROM   Ther Proc 1 - Details wall slides 3 x 10 sec   Ther Proc 2 ER at neutral 3 x 10 sec   Ther Proc 2 - Details ER at 90/90 3 x 10 sec   Ther Proc 3 posterior shoudler stretch 3 x 10 sec   Ther Proc 3 - Details * this program series, daily, gentle   Skilled Intervention manual and verbl cuing   Patient Response/Progress fatigue   Neuromuscular Re-education   Neuromuscular re-ed of mvmt, balance, coord, kinesthetic sense, posture, proprioception minutes (60474) 25   Neuro Re-ed 2 shoulder Adduction x  5   Neuro Re-ed 2 - Details supine ceiling punch x 5 , 5 ounces   Neuro Re-ed 3 qreverse pendulum x 5 , 5 ounces   Neuro Re-ed 3 - Details * this program 3x/week   Neuromuscular Re-education Neuro Re-ed 2;Neuro Re-ed 3;Neuro Re-ed 4;Neuro Re-ed 5   Neuro Re-ed 1 shoudler isometric flexion x 5   Neuro Re-ed 1 - Details shoudler abduction x 5   Manual Therapy   Manual Therapy 3 manual cx distraction upper and lower   Manual Therapy 3 - Details 30 sec x 3-5 reps each   Education   Learner/Method Patient;Demonstration;Pictures/Video;No Barriers to Learning   Plan   Home program see pT rx   Plan for next session pec and thoracic spine mobility   Comments   Comments follow MD order, stay conservative rx   Total Session Time   Timed Code Treatment Minutes 55   Total Treatment Time (sum of timed and untimed services) 55         [facility]                                                                                    OUTPATIENT PHYSICAL THERAPY    PLAN OF TREATMENT FOR OUTPATIENT REHABILITATION   Patient's Last Name, First Name, Guicho Molina YOB: 1950   Provider's Name   [facility]   Medical Record No.  [id number]     Onset Date: 04/10/24  Start of Care Date: 06/04/24     Medical Diagnosis:  R chronic shoulde rpain, RTC deficiency, OA,      PT Treatment Diagnosis:  R shoudler pain, OA, Scap dyskinesia Plan of Treatment  Frequency/Duration: 1x/week/ 12 weeks    Certification date from 08/28/24 to 11/19/24         See note for plan of treatment details and functional goals     Hilario Najera, PT                         I CERTIFY THE NEED FOR THESE SERVICES FURNISHED UNDER        THIS PLAN OF TREATMENT AND WHILE UNDER MY CARE .             Physician Signature               Date    X_____________________________________________________                  Referring Provider:  Carmine Mojica    Initial Assessment  See Epic Evaluation- Start of Care Date: 06/04/24

## 2024-09-17 ENCOUNTER — THERAPY VISIT (OUTPATIENT)
Dept: PHYSICAL THERAPY | Facility: CLINIC | Age: 74
End: 2024-09-17
Payer: COMMERCIAL

## 2024-09-17 DIAGNOSIS — M54.50 CHRONIC BILATERAL LOW BACK PAIN WITHOUT SCIATICA: Primary | ICD-10-CM

## 2024-09-17 DIAGNOSIS — G89.29 CHRONIC BILATERAL LOW BACK PAIN WITHOUT SCIATICA: Primary | ICD-10-CM

## 2024-09-17 PROCEDURE — 97161 PT EVAL LOW COMPLEX 20 MIN: CPT | Mod: GP | Performed by: PHYSICAL THERAPIST

## 2024-09-17 PROCEDURE — 97110 THERAPEUTIC EXERCISES: CPT | Mod: GP | Performed by: PHYSICAL THERAPIST

## 2024-09-17 PROCEDURE — 97032 APPL MODALITY 1+ESTIM EA 15: CPT | Mod: GP | Performed by: PHYSICAL THERAPIST

## 2024-09-24 ENCOUNTER — THERAPY VISIT (OUTPATIENT)
Dept: PHYSICAL THERAPY | Facility: CLINIC | Age: 74
End: 2024-09-24
Payer: COMMERCIAL

## 2024-09-24 DIAGNOSIS — G89.29 CHRONIC BILATERAL LOW BACK PAIN WITHOUT SCIATICA: Primary | ICD-10-CM

## 2024-09-24 DIAGNOSIS — M54.50 CHRONIC BILATERAL LOW BACK PAIN WITHOUT SCIATICA: Primary | ICD-10-CM

## 2024-09-24 PROCEDURE — 97110 THERAPEUTIC EXERCISES: CPT | Mod: GP | Performed by: PHYSICAL THERAPIST

## 2024-09-24 NOTE — PROGRESS NOTES
PHYSICAL THERAPY EVALUATION  Type of Visit: Evaluation    Guicho is a pleasant 75 yo male with multiple joint pain complaints that includes his neck, R shoudler, low back, SIJ and rib pain. He is referred for low back and rib pain today under the direction of Dr. Harshad Burris.               Subjective       Presenting condition or subjective complaint: Low back and right side pain/also rib pain  Date of onset: 09/11/24    Relevant medical history: Anemia; Arthritis; Bladder or bowel problems; Cancer; Chest pain; Depression; Fibromyalgia; Hearing problems; Heart problems; High blood pressure; Implanted device; Incontinence; Kidney disease; Mental Illness; Numbness or tingling in perianal area; Osteoporosis; Overweight; Pacemaker; Pain at night or rest; Significant weakness; Sleep disorder like apnea; Thyroid problems; Vision problems   Dates & types of surgery: Have provided list in past    Prior diagnostic imaging/testing results: CT scan; X-ray     Prior therapy history for the same diagnosis, illness or injury: Yes      Prior Level of Function  Transfers: Independent  Ambulation: Independent  ADL: Independent      Living Environment  Social support: With a significant other or spouse   Type of home: House   Stairs to enter the home: Yes 2     Ramp: No   Stairs inside the home: Yes 8 Is there a railing: Yes     Help at home: Self Cares (home health aide/personal care attendant, family, etc)  Equipment owned: Straight Cane; Four-point cane; Walker with wheels; Raised toilet seat     Employment: No    Hobbies/Interests: watching sports/boating/traveling/grandkids    Patient goals for therapy: play sports    Pain assessment:  relatively constant R low back and SIJ area pain that is rated at 5/10      Objective   LUMBAR SPINE EVALUATION  PAIN: Pain is Exacerbated By: walking, lifting, standing >5 minutes   Pain is Relieved By: stretch  INTEGUMENTARY (edema, incisions): WNL  POSTURE:  fair, forward head, rounded  shoudlers, flattened lordosis lumbar spine, eccentuated thoracic spine.   GAIT:   Weightbearing Status: WBAT  Assistive Device(s): None  Gait Deviations: WNL  BALANCE/PROPRIOCEPTION: WNL  WEIGHTBEARING ALIGNMENT: WNL  NON-WEIGHTBEARING ALIGNMENT: WNL   ROM   Lumbar Flexion Finger tips to mid shin , reduces LBP temporary    Lumbar Extension Moderately limited extension with ERP , no effect      PELVIC/SI SCREEN: Sacroiliac Provocation Test: (+) compression, distraction, thrust , palpation     MYOTOMES: WNL  DTR S: WNL    PALPATION:  R SIJ       Assessment & Plan   CLINICAL IMPRESSIONS  Medical Diagnosis: R SIJ , LBP    Treatment Diagnosis: R SIJ, LBP   Impression/Assessment: Patient is a 74 year old male with R LBP , SIJ  complaints.  The following significant findings have been identified: Pain, Decreased ROM/flexibility, Decreased joint mobility, and globally decondition . These impairments interfere with their ability to perform self care tasks, household chores, and driving  as compared to previous level of function.     Clinical Decision Making (Complexity):  Clinical Presentation: Evolving/Changing  Clinical Presentation Rationale: based on medical and personal factors listed in PT evaluation  Clinical Decision Making (Complexity): Moderate complexity    PLAN OF CARE  Treatment Interventions:  Modalities: E-stim, Hot Pack  Interventions: Manual Therapy, Neuromuscular Re-education, Therapeutic Activity, Therapeutic Exercise, Self-Care/Home Management    Long Term Goals     PT Goal 1  Goal Identifier: standing, walking, lifting  Goal Description: ability to stand or walk x 10 minutes without pain - STG, ability to stand and walk 20-30 minutes pain free - LTG  Rationale: to maximize safety and independence with performance of ADLs and functional tasks;to maximize safety and independence within the home;to maximize safety and independence with self cares  Target Date: 12/17/24      Frequency of Treatment:  1x/week  Duration of Treatment: 12 weeks    Education Assessment:   Learner/Method: Patient;Demonstration;Pictures/Video;No Barriers to Learning    Risks and benefits of evaluation/treatment have been explained.   Patient/Family/caregiver agrees with Plan of Care.     Evaluation Time:     PT Eval, Low Complexity Minutes (73853): 15       Signing Clinician: ALEN [facility]                                                                                   OUTPATIENT PHYSICAL THERAPY      PLAN OF TREATMENT FOR OUTPATIENT REHABILITATION   Patient's Last Name, First Name, KETTYGuicho Almanza YOB: 1950   Provider's Name   [facility]   Medical Record No.  [id number]     Onset Date: 09/11/24  Start of Care Date: 09/17/24     Medical Diagnosis:  R SIJ , LBP      PT Treatment Diagnosis:  R SIJ, LBP Plan of Treatment  Frequency/Duration: 1x/week/ 12 weeks    Certification date from 09/17/24 to 12/10/24         See note for plan of treatment details and functional goals     Hilario Najera, PT                         I CERTIFY THE NEED FOR THESE SERVICES FURNISHED UNDER        THIS PLAN OF TREATMENT AND WHILE UNDER MY CARE .             Physician Signature               Date    X_____________________________________________________                  Referring Provider:  Harshad Burris    Initial Assessment  See Epic Evaluation- Start of Care Date: 09/17/24

## 2024-10-22 ENCOUNTER — THERAPY VISIT (OUTPATIENT)
Dept: PHYSICAL THERAPY | Facility: CLINIC | Age: 74
End: 2024-10-22
Payer: COMMERCIAL

## 2024-10-22 DIAGNOSIS — M54.50 CHRONIC BILATERAL LOW BACK PAIN WITHOUT SCIATICA: Primary | ICD-10-CM

## 2024-10-22 DIAGNOSIS — G89.29 CHRONIC BILATERAL LOW BACK PAIN WITHOUT SCIATICA: Primary | ICD-10-CM

## 2024-10-22 DIAGNOSIS — M54.2 NECK PAIN: ICD-10-CM

## 2024-10-22 PROCEDURE — 97140 MANUAL THERAPY 1/> REGIONS: CPT | Mod: GP | Performed by: PHYSICAL THERAPIST

## 2024-10-22 PROCEDURE — 97110 THERAPEUTIC EXERCISES: CPT | Mod: GP | Performed by: PHYSICAL THERAPIST

## 2024-10-22 PROCEDURE — 97032 APPL MODALITY 1+ESTIM EA 15: CPT | Mod: GP | Performed by: PHYSICAL THERAPIST

## 2024-11-01 ENCOUNTER — THERAPY VISIT (OUTPATIENT)
Dept: PHYSICAL THERAPY | Facility: CLINIC | Age: 74
End: 2024-11-01
Payer: COMMERCIAL

## 2024-11-01 DIAGNOSIS — M54.2 NECK PAIN: Primary | ICD-10-CM

## 2024-11-01 PROCEDURE — 97032 APPL MODALITY 1+ESTIM EA 15: CPT | Mod: GP | Performed by: PHYSICAL THERAPIST

## 2024-11-01 PROCEDURE — 97110 THERAPEUTIC EXERCISES: CPT | Mod: GP | Performed by: PHYSICAL THERAPIST

## 2024-11-01 PROCEDURE — 97010 HOT OR COLD PACKS THERAPY: CPT | Mod: GP | Performed by: PHYSICAL THERAPIST

## 2024-11-01 NOTE — PROGRESS NOTES
PHYSICAL THERAPY EVALUATION  Type of Visit: Evaluation    Pt presents to PT with neck pain over the past month that has somewhat improved over the past two weeks, severely limiting all motions.    Cervical CT 10/15/24:  1. No acute displaced fracture or malalignment of the cervical spine.   2. Redemonstration of moderate degenerative changes of the visualized spine as described above; this has slightly progressed when compared to 07/03/2023.            Subjective         Presenting condition or subjective complaint:    Date of onset: 10/14/24    Relevant medical history:     Dates & types of surgery:      Prior diagnostic imaging/testing results:       Prior therapy history for the same diagnosis, illness or injury:        Prior Level of Function  Transfers:   Ambulation:   ADL:   IADL:     Living Environment  Social support:     Type of home:     Stairs to enter the home:         Ramp:     Stairs inside the home:         Help at home:    Equipment owned:       Employment:      Hobbies/Interests:      Patient goals for therapy:      Pain assessment: pain and global limitation in neck that is near constant.     Objective   CERVICAL SPINE EVALUATION  PAIN:   INTEGUMENTARY (edema, incisions):   POSTURE: rounded shoulders and forward head, slouched posture  GAIT:   Weightbearing Status:   Assistive Device(s):   Gait Deviations:   BALANCE/PROPRIOCEPTION:   WEIGHTBEARING ALIGNMENT:   ROM: AROM cervical ext: 25degs flx: 2fingers SB L: 14degs R: 16degs rotation L:40degs R: 25degs   MYOTOMES:   DTR S:   CORD SIGNS:   DERMATOMES: normal light touch sensation  NEURAL TENSION:   FLEXIBILITY: soft tissue tightness in B upper traps, levator scaps, mid scalenes  SPECIAL TESTS: sharps pursor neg, shear and kick tests neg, spurling's neg   PALPATION: no TTP  SPINAL SEGMENTAL CONCLUSIONS: C2-7 hypomobility, relief with manual traction     Assessment & Plan   CLINICAL IMPRESSIONS  Medical Diagnosis: neck pain    Treatment Diagnosis:  neck pain   Impression/Assessment: Patient is a 74 year old male with neck pain complaints.  The following significant findings have been identified: Pain, Decreased ROM/flexibility, Decreased joint mobility, and Impaired posture. These impairments interfere with their ability to perform self care tasks, recreational activities, household chores, and driving  as compared to previous level of function.     Clinical Decision Making (Complexity):  Clinical Presentation: Stable/Uncomplicated  Clinical Presentation Rationale: based on medical and personal factors listed in PT evaluation  Clinical Decision Making (Complexity): Low complexity    PLAN OF CARE  Treatment Interventions:  Modalities: Cryotherapy, E-stim, Hot Pack  Interventions: Manual Therapy, Neuromuscular Re-education, Therapeutic Activity, Therapeutic Exercise    Long Term Goals     PT Goal 1  Goal Identifier: cervical AROM  Goal Description: pt will demonstrate increased cervical rotation and sidebending for driving.  Rationale: to maximize safety and independence within the community;to maximize safety and independence within the home  Target Date: 01/24/25      Frequency of Treatment: 1x/wk  Duration of Treatment: 12wks    Recommended Referrals to Other Professionals:   Education Assessment:        Risks and benefits of evaluation/treatment have been explained.   Patient/Family/caregiver agrees with Plan of Care.     Evaluation Time:             Signing Clinician: Linden Byers, ALEN        [facility]                                                                                   OUTPATIENT PHYSICAL THERAPY      PLAN OF TREATMENT FOR OUTPATIENT REHABILITATION   Patient's Last Name, First Name, Guicho Molina YOB: 1950   Provider's Name   [facility]   Medical Record No.  [id number]     Onset Date: 10/14/24  Start of Care Date: 11/01/24     Medical Diagnosis:  neck  pain      PT Treatment Diagnosis:  neck pain Plan of Treatment  Frequency/Duration: 1x/wk/ 12wks    Certification date from 11/01/24 to 01/24/25         See note for plan of treatment details and functional goals     Linden Byers, PT                         I CERTIFY THE NEED FOR THESE SERVICES FURNISHED UNDER        THIS PLAN OF TREATMENT AND WHILE UNDER MY CARE .             Physician Signature               Date    X_____________________________________________________                  Referring Provider:  Harshad Burris    Initial Assessment  See Epic Evaluation- Start of Care Date: 11/01/24

## 2024-11-05 ENCOUNTER — THERAPY VISIT (OUTPATIENT)
Dept: PHYSICAL THERAPY | Facility: CLINIC | Age: 74
End: 2024-11-05
Payer: COMMERCIAL

## 2024-11-05 DIAGNOSIS — M54.50 CHRONIC BILATERAL LOW BACK PAIN WITHOUT SCIATICA: ICD-10-CM

## 2024-11-05 DIAGNOSIS — G89.29 CHRONIC BILATERAL LOW BACK PAIN WITHOUT SCIATICA: ICD-10-CM

## 2024-11-05 DIAGNOSIS — M54.2 NECK PAIN: Primary | ICD-10-CM

## 2024-11-05 PROCEDURE — 97032 APPL MODALITY 1+ESTIM EA 15: CPT | Mod: GP | Performed by: PHYSICAL THERAPIST

## 2024-11-05 PROCEDURE — 97110 THERAPEUTIC EXERCISES: CPT | Mod: GP | Performed by: PHYSICAL THERAPIST

## 2024-11-12 ENCOUNTER — THERAPY VISIT (OUTPATIENT)
Dept: PHYSICAL THERAPY | Facility: CLINIC | Age: 74
End: 2024-11-12
Payer: COMMERCIAL

## 2024-11-12 DIAGNOSIS — M54.2 NECK PAIN: Primary | ICD-10-CM

## 2024-11-12 PROCEDURE — 97032 APPL MODALITY 1+ESTIM EA 15: CPT | Mod: GP | Performed by: PHYSICAL THERAPIST

## 2024-11-12 PROCEDURE — 97110 THERAPEUTIC EXERCISES: CPT | Mod: GP | Performed by: PHYSICAL THERAPIST

## 2024-11-14 ENCOUNTER — THERAPY VISIT (OUTPATIENT)
Dept: PHYSICAL THERAPY | Facility: CLINIC | Age: 74
End: 2024-11-14
Payer: COMMERCIAL

## 2024-11-14 DIAGNOSIS — M54.2 NECK PAIN: Primary | ICD-10-CM

## 2024-12-05 ENCOUNTER — THERAPY VISIT (OUTPATIENT)
Dept: PHYSICAL THERAPY | Facility: CLINIC | Age: 74
End: 2024-12-05
Payer: COMMERCIAL

## 2024-12-05 DIAGNOSIS — M54.2 NECK PAIN: Primary | ICD-10-CM

## 2024-12-27 ENCOUNTER — THERAPY VISIT (OUTPATIENT)
Dept: PHYSICAL THERAPY | Facility: CLINIC | Age: 74
End: 2024-12-27
Payer: COMMERCIAL

## 2024-12-27 DIAGNOSIS — M54.2 NECK PAIN: Primary | ICD-10-CM

## 2024-12-27 PROCEDURE — 97012 MECHANICAL TRACTION THERAPY: CPT | Mod: GP | Performed by: PHYSICAL THERAPIST

## 2024-12-27 PROCEDURE — 97110 THERAPEUTIC EXERCISES: CPT | Mod: GP | Performed by: PHYSICAL THERAPIST

## 2025-01-14 ENCOUNTER — THERAPY VISIT (OUTPATIENT)
Dept: PHYSICAL THERAPY | Facility: CLINIC | Age: 75
End: 2025-01-14
Payer: COMMERCIAL

## 2025-01-14 DIAGNOSIS — M54.2 NECK PAIN: Primary | ICD-10-CM

## 2025-01-14 PROCEDURE — 97012 MECHANICAL TRACTION THERAPY: CPT | Mod: GP | Performed by: PHYSICAL THERAPIST

## 2025-01-14 PROCEDURE — 97110 THERAPEUTIC EXERCISES: CPT | Mod: GP | Performed by: PHYSICAL THERAPIST

## 2025-01-21 ENCOUNTER — THERAPY VISIT (OUTPATIENT)
Dept: PHYSICAL THERAPY | Facility: CLINIC | Age: 75
End: 2025-01-21
Payer: COMMERCIAL

## 2025-01-21 DIAGNOSIS — M54.2 NECK PAIN: Primary | ICD-10-CM

## 2025-01-21 PROCEDURE — 97110 THERAPEUTIC EXERCISES: CPT | Mod: GP | Performed by: PHYSICAL THERAPIST

## 2025-01-21 PROCEDURE — 97035 APP MDLTY 1+ULTRASOUND EA 15: CPT | Mod: GP | Performed by: PHYSICAL THERAPIST

## 2025-01-28 ENCOUNTER — THERAPY VISIT (OUTPATIENT)
Dept: PHYSICAL THERAPY | Facility: CLINIC | Age: 75
End: 2025-01-28
Payer: COMMERCIAL

## 2025-01-28 DIAGNOSIS — M54.2 NECK PAIN: Primary | ICD-10-CM

## 2025-01-28 PROCEDURE — 97110 THERAPEUTIC EXERCISES: CPT | Mod: GP | Performed by: PHYSICAL THERAPIST

## 2025-01-28 PROCEDURE — 97140 MANUAL THERAPY 1/> REGIONS: CPT | Mod: GP | Performed by: PHYSICAL THERAPIST

## 2025-02-04 ENCOUNTER — THERAPY VISIT (OUTPATIENT)
Dept: PHYSICAL THERAPY | Facility: CLINIC | Age: 75
End: 2025-02-04
Payer: COMMERCIAL

## 2025-02-04 DIAGNOSIS — M54.2 NECK PAIN: Primary | ICD-10-CM

## 2025-02-04 PROCEDURE — 97140 MANUAL THERAPY 1/> REGIONS: CPT | Mod: GP | Performed by: PHYSICAL THERAPIST

## 2025-02-04 PROCEDURE — 97110 THERAPEUTIC EXERCISES: CPT | Mod: GP | Performed by: PHYSICAL THERAPIST

## 2025-02-18 ENCOUNTER — THERAPY VISIT (OUTPATIENT)
Dept: PHYSICAL THERAPY | Facility: CLINIC | Age: 75
End: 2025-02-18
Payer: COMMERCIAL

## 2025-02-18 DIAGNOSIS — G89.29 CHRONIC RIGHT SHOULDER PAIN: ICD-10-CM

## 2025-02-18 DIAGNOSIS — M25.511 CHRONIC RIGHT SHOULDER PAIN: ICD-10-CM

## 2025-02-18 DIAGNOSIS — M54.2 NECK PAIN: Primary | ICD-10-CM

## 2025-02-18 PROCEDURE — 97110 THERAPEUTIC EXERCISES: CPT | Mod: GP | Performed by: PHYSICAL THERAPIST

## 2025-02-18 PROCEDURE — 97140 MANUAL THERAPY 1/> REGIONS: CPT | Mod: GP | Performed by: PHYSICAL THERAPIST

## 2025-02-24 NOTE — PROGRESS NOTES
PLAN  Continue therapy per current plan of care.     02/18/25 0500   Appointment Info   Signing clinician's name / credentials Akhil Najera PT ATC   Total/Authorized Visits 12   Visits Used 14   Medical Diagnosis neck pain   PT Tx Diagnosis neck pain   Progress Note/Certification   Start of Care Date 11/01/24   Onset of illness/injury or Date of Surgery 10/14/24   Therapy Frequency 1x/wk   Predicted Duration 12wks   Certification date from 12/15/24   Certification date to 03/09/24   Progress Note Due Date 02/21/24   Progress Note Completed Date 02/18/25   PT Goal 1   Goal Identifier sleeping   Goal Description STG: sleep through the night no limits   Rationale to maximize safety and independence with performance of ADLs and functional tasks;to maximize safety and independence within the home;to maximize safety and independence with self cares   Goal Progress intermittant sleep to 2-3 day, better than initial   Target Date 03/02/25   Subjective Report   Subjective Report Patient arrived late to appointment  by 10 minutes due to traffic. Primary complaint today is mid back discomfort without known reason. He is hit and miss with neck and shoulder pain. He would like to pursue strengthening more proggressive withhis shoulder . Going forward, address forward head, rounded shoulders and thoracic kyphosis as well.   Objective Measure 1   Objective Measure cx rotation   Details imptoved both direction, limited   Objective Measure 2   Objective Measure R RTC weakness and painful   Details return to supine RTC program   PT Modalities   PT Modalities Ultrasound   Therapeutic Procedure/Exercise   Therapeutic Procedures: strength, endurance, ROM, flexibility minutes (33228) 31   Ther Proc 1 Next : Pec stretch, thoracic extension/rotation   Ther Proc 2   (HEP)   Ther Proc 3 UBE x 3 minutes 30:30 40-70rpm  (HEP)   Ther Proc 4 Wand flexion stretch 10 x 3 sec   Ther Proc 4 - Details Wand ER stretch 10 x 3 sec   Ther Proc 5 -  Details Supien RTC ceiling punch x 20 , 5 ounces   Ther Proc 6 SUpine reverse pendulum x 10, 5 ounces   Ther Proc 6 - Details scap retraction x 20   Ther Proc 7 arm extension x 10   Patient Response/Progress some irritation in R posterior shoulder with resisted motions   Manual Therapy   Manual Therapy: Mobilization, MFR, MLD, friction massage minutes (99330) 10   Manual Therapy Manual Therapy 2;Manual Therapy 3;Manual Therapy 4;Manual Therapy 5;Manual Therapy 6   Manual Therapy 1 R UT/levator and lower cx spine   Manual Therapy 1 - Details grade 2-3 , no effect   Manual Therapy 2 Manual cx traction 30 sec x 5   Manual Therapy 2 - Details Manual Cx Sideglide x 10   Manual Therapy 3 manual cx rotation x 10   Manual Therapy 3 - Details manual cx sidebend x 10   Plan   Home program see ptrx   Comments   Comments progress is slow. Hold off therapy exercises for the neck at home   Total Session Time   Timed Code Treatment Minutes 41   Total Treatment Time (sum of timed and untimed services) 41           Referring Provider:  Harshad Burris

## 2025-03-04 ENCOUNTER — THERAPY VISIT (OUTPATIENT)
Dept: PHYSICAL THERAPY | Facility: CLINIC | Age: 75
End: 2025-03-04
Payer: COMMERCIAL

## 2025-03-04 DIAGNOSIS — M54.2 NECK PAIN: Primary | ICD-10-CM

## 2025-03-04 PROCEDURE — 97110 THERAPEUTIC EXERCISES: CPT | Mod: GP | Performed by: PHYSICAL THERAPIST
